# Patient Record
Sex: FEMALE | Race: WHITE | NOT HISPANIC OR LATINO | Employment: OTHER | URBAN - METROPOLITAN AREA
[De-identification: names, ages, dates, MRNs, and addresses within clinical notes are randomized per-mention and may not be internally consistent; named-entity substitution may affect disease eponyms.]

---

## 2017-01-10 ENCOUNTER — HOSPITAL ENCOUNTER (OUTPATIENT)
Dept: NEUROLOGY | Facility: CLINIC | Age: 67
Setting detail: THERAPIES SERIES
Discharge: STILL ADMITTED OR ADMINISTRATIVE | End: 2017-01-10
Attending: SOCIAL WORKER

## 2017-01-10 DIAGNOSIS — F43.23 ADJUSTMENT DISORDER WITH MIXED ANXIETY AND DEPRESSED MOOD: ICD-10-CM

## 2017-01-26 ENCOUNTER — OFFICE VISIT (OUTPATIENT)
Dept: PSYCHOLOGY | Facility: CLINIC | Age: 67
End: 2017-01-26

## 2017-01-26 DIAGNOSIS — Z63.0 MARITAL/PARTNER RELATIONAL PROBLEM: ICD-10-CM

## 2017-01-26 DIAGNOSIS — F32.1 MODERATE SINGLE CURRENT EPISODE OF MAJOR DEPRESSIVE DISORDER (H): Primary | ICD-10-CM

## 2017-01-26 SDOH — SOCIAL STABILITY - SOCIAL INSECURITY: PROBLEMS IN RELATIONSHIP WITH SPOUSE OR PARTNER: Z63.0

## 2017-01-26 NOTE — PROGRESS NOTES
Health Psychology                  Clinic    Department of Medicine  Kendra Ceballos, PhD, LP [phone]                          Clinics and Surgery Center  [facility] Garett Hoyt, PhD, LP [phone]                  3rd Floor  [place] Mail Code [address]   Jah Haas, PhD, ABPP, LP [phone]     [address] [facility],   [address] [facility],  Esperanza Rosas,  PhD, LP [phone]            [place] [address] Blanca Gonzalez, PhD, LP [phone]    Health Psychology Follow-Up Note    SUBJECTIVE:  Mercedes Hairston is a 66-year-old female self-referred to Health Psychology for difficulty coping with marital stress and her partner's illness.  She was seen for a psychotherapy for depression, adjustment to relationship stress, and alcohol use.  Patient and her partner begun marital therapy at [facility] and patient stated that it is going well. She stated she continues to feel sad and afraid related to uncertainty in housing, but has realized sale of the home does not mean that they will break-up. Has begun starting to accept sale of her partner's home rather than continuing to fight it. Patient stated she largely cut out alcohol since 1/2/17 due to abstaining from alcohol in January with friends. Has not  Had difficulty stopping alcohol or any withdrawal symptoms, and she wishes to continue to abstain after Jan ends. She wishes to find support groups for alcohol use in the area.  Encouraged consideration of evidence that supports and refutes automatic thoughts. She reported she had been thinking flexibly by finding the positive in the midst of housing and relations stressors. Encouraged patient to consider aspects of situation that are in vs out of her control. Discussed what was in her control (self-care, responses to stress, behavior, decisions about if and when she moves and stays in relationship). She reported finding great benefit in thinking flexibly  about her housing and relationship. Encouraged present-moment focus by practicing mindful engagement in daily tasks. She also discussed new awareness of possibility accepting situation, which she feels would free her and her partner to build a new chapter together despite not wanting to sell home.     OBJECTIVE:  The patient was on time, appropriately groomed and dressed and demonstrated good eye contact.  She was alert and oriented to person, place, time and situation.  There was no evidence of psychomotor agitation. Cognition and memory were not formally assessed, but no difficulties were apparent upon interview.  Speech was clear, logical and coherent and of normal rate, rhythm and volume.  Thought content was appropriate to situation.  Thoughts were overall logical and organized.  Mood was dysphoric and affect was mood congruent.  The patient was tearful when describing her marital stress.  Insight and motivation appeared good.  The patient denies current suicidal or assaultive ideation, plan, or intent.     ASSESSMENT:  Mercedes Hairston is a 66-year-old female with COPD and breast cancer who presents major depressive disorder, moderate, with anxious distress and a comorbid alcohol use disorder.  She is motivated to participate in therapy at this time to improve her ability to cope with depression and anxiety, reduce alcohol use, and improve her ability to assertively communicate. Appeared to understand rationale for cognitive behavioral strategies. Appearing to derive benefit from cognitive interventions. Will monitor need for separate alcohol use treatment as our work continues.     DIAGNOSIS:  Major depressive disorder, single episode, moderate, with anxious distress (F32.1)  Alcohol use disorder (F10.10)   Marital/partner relational stress (Z63.0)    PLAN:  The patient will return to clinic in approximately 3 weeks for psychotherapy for depression.     Time in: 3:04  Time out: 3:58  Extended session due to  complexity of case and length of interval.    Esperanza Rosas, PhD, LP  Clinical Health Psychologist    Tx plan completed: 11/23/16  Tx plan due:  11/23/17

## 2017-07-19 ENCOUNTER — AMBULATORY - HEALTHEAST (OUTPATIENT)
Dept: NEUROLOGY | Facility: CLINIC | Age: 67
End: 2017-07-19

## 2017-12-15 RX ORDER — AMPICILLIN TRIHYDRATE 250 MG
600 CAPSULE ORAL DAILY
COMMUNITY
End: 2024-09-27

## 2017-12-15 RX ORDER — EXEMESTANE 25 MG/1
25 TABLET ORAL DAILY
COMMUNITY
End: 2024-09-27

## 2017-12-15 RX ORDER — MAG HYDROX/ALUMINUM HYD/SIMETH 400-400-40
450 SUSPENSION, ORAL (FINAL DOSE FORM) ORAL 3 TIMES DAILY
COMMUNITY
End: 2024-09-27

## 2017-12-18 ENCOUNTER — SURGERY (OUTPATIENT)
Age: 67
End: 2017-12-18

## 2017-12-18 ENCOUNTER — ANESTHESIA (OUTPATIENT)
Dept: SURGERY | Facility: CLINIC | Age: 67
End: 2017-12-18
Payer: MEDICARE

## 2017-12-18 ENCOUNTER — HOSPITAL ENCOUNTER (OUTPATIENT)
Facility: CLINIC | Age: 67
Discharge: HOME | End: 2017-12-18
Attending: OPHTHALMOLOGY | Admitting: OPHTHALMOLOGY
Payer: MEDICARE

## 2017-12-18 ENCOUNTER — ANESTHESIA EVENT (OUTPATIENT)
Dept: SURGERY | Facility: CLINIC | Age: 67
End: 2017-12-18
Payer: MEDICARE

## 2017-12-18 VITALS
BODY MASS INDEX: 26.96 KG/M2 | WEIGHT: 167.77 LBS | OXYGEN SATURATION: 97 % | RESPIRATION RATE: 16 BRPM | SYSTOLIC BLOOD PRESSURE: 127 MMHG | HEIGHT: 66 IN | TEMPERATURE: 96.6 F | DIASTOLIC BLOOD PRESSURE: 61 MMHG

## 2017-12-18 DIAGNOSIS — H21.82: Primary | ICD-10-CM

## 2017-12-18 DIAGNOSIS — H25.12 AGE-RELATED NUCLEAR CATARACT OF LEFT EYE: ICD-10-CM

## 2017-12-18 PROCEDURE — 25000132 ZZH RX MED GY IP 250 OP 250 PS 637: Mod: GY | Performed by: OPHTHALMOLOGY

## 2017-12-18 PROCEDURE — 25000128 H RX IP 250 OP 636: Performed by: OPHTHALMOLOGY

## 2017-12-18 PROCEDURE — 36000104 ZZH EYE SURGERY LEVEL 4 1ST 30 MIN: Performed by: OPHTHALMOLOGY

## 2017-12-18 PROCEDURE — 71000028 ZZH EYE RECOVERY PHASE 2 EACH 15 MINS: Performed by: OPHTHALMOLOGY

## 2017-12-18 PROCEDURE — 25000128 H RX IP 250 OP 636: Performed by: NURSE ANESTHETIST, CERTIFIED REGISTERED

## 2017-12-18 PROCEDURE — 66711 ECP CILIARY BODY DESTRUCTION: CPT | Performed by: OPHTHALMOLOGY

## 2017-12-18 PROCEDURE — 27210995 ZZH RX 272: Performed by: OPHTHALMOLOGY

## 2017-12-18 PROCEDURE — 25000125 ZZHC RX 250: Performed by: OPHTHALMOLOGY

## 2017-12-18 PROCEDURE — V2632 POST CHMBR INTRAOCULAR LENS: HCPCS | Performed by: OPHTHALMOLOGY

## 2017-12-18 PROCEDURE — A9270 NON-COVERED ITEM OR SERVICE: HCPCS | Performed by: OPHTHALMOLOGY

## 2017-12-18 PROCEDURE — 25000125 ZZHC RX 250: Performed by: NURSE ANESTHETIST, CERTIFIED REGISTERED

## 2017-12-18 PROCEDURE — 37000008 ZZH ANESTHESIA TECHNICAL FEE, 1ST 30 MIN: Performed by: OPHTHALMOLOGY

## 2017-12-18 PROCEDURE — 40000170 ZZH STATISTIC PRE-PROCEDURE ASSESSMENT II: Performed by: OPHTHALMOLOGY

## 2017-12-18 PROCEDURE — 27210794 ZZH OR GENERAL SUPPLY STERILE: Performed by: OPHTHALMOLOGY

## 2017-12-18 PROCEDURE — A9270 NON-COVERED ITEM OR SERVICE: HCPCS | Mod: GY | Performed by: OPHTHALMOLOGY

## 2017-12-18 PROCEDURE — 36000105 ZZH EYE SURGERY LEVEL 4 EA 15 ADDTL MIN: Performed by: OPHTHALMOLOGY

## 2017-12-18 PROCEDURE — 37000009 ZZH ANESTHESIA TECHNICAL FEE, EACH ADDTL 15 MIN: Performed by: OPHTHALMOLOGY

## 2017-12-18 PROCEDURE — 25000125 ZZHC RX 250: Performed by: ANESTHESIOLOGY

## 2017-12-18 DEVICE — EYE IMP IOL ALCON PCL SN60WF ACRYSOF IQ 19.0: Type: IMPLANTABLE DEVICE | Site: EYE | Status: FUNCTIONAL

## 2017-12-18 RX ORDER — TROPICAMIDE 10 MG/ML
1 SOLUTION/ DROPS OPHTHALMIC
Status: COMPLETED | OUTPATIENT
Start: 2017-12-18 | End: 2017-12-18

## 2017-12-18 RX ORDER — SODIUM CHLORIDE, SODIUM LACTATE, POTASSIUM CHLORIDE, CALCIUM CHLORIDE 600; 310; 30; 20 MG/100ML; MG/100ML; MG/100ML; MG/100ML
INJECTION, SOLUTION INTRAVENOUS CONTINUOUS
Status: DISCONTINUED | OUTPATIENT
Start: 2017-12-18 | End: 2017-12-18 | Stop reason: HOSPADM

## 2017-12-18 RX ORDER — HYDROMORPHONE HYDROCHLORIDE 1 MG/ML
.3-.5 INJECTION, SOLUTION INTRAMUSCULAR; INTRAVENOUS; SUBCUTANEOUS EVERY 10 MIN PRN
Status: DISCONTINUED | OUTPATIENT
Start: 2017-12-18 | End: 2017-12-18 | Stop reason: HOSPADM

## 2017-12-18 RX ORDER — FENTANYL CITRATE 50 UG/ML
25-50 INJECTION, SOLUTION INTRAMUSCULAR; INTRAVENOUS
Status: DISCONTINUED | OUTPATIENT
Start: 2017-12-18 | End: 2017-12-18 | Stop reason: HOSPADM

## 2017-12-18 RX ORDER — PHENYLEPHRINE HYDROCHLORIDE 25 MG/ML
1 SOLUTION/ DROPS OPHTHALMIC
Status: COMPLETED | OUTPATIENT
Start: 2017-12-18 | End: 2017-12-18

## 2017-12-18 RX ORDER — DEXAMETHASONE SODIUM PHOSPHATE 4 MG/ML
INJECTION, SOLUTION INTRA-ARTICULAR; INTRALESIONAL; INTRAMUSCULAR; INTRAVENOUS; SOFT TISSUE PRN
Status: DISCONTINUED | OUTPATIENT
Start: 2017-12-18 | End: 2017-12-18

## 2017-12-18 RX ORDER — DEXAMETHASONE SODIUM PHOSPHATE 4 MG/ML
INJECTION, SOLUTION INTRA-ARTICULAR; INTRALESIONAL; INTRAMUSCULAR; INTRAVENOUS; SOFT TISSUE PRN
Status: DISCONTINUED | OUTPATIENT
Start: 2017-12-18 | End: 2017-12-18 | Stop reason: HOSPADM

## 2017-12-18 RX ORDER — OFLOXACIN 3 MG/ML
1 SOLUTION/ DROPS OPHTHALMIC 4 TIMES DAILY
Qty: 2 ML | Refills: 0 | Status: SHIPPED | OUTPATIENT
Start: 2017-12-18 | End: 2017-12-25

## 2017-12-18 RX ORDER — LIDOCAINE HYDROCHLORIDE 10 MG/ML
INJECTION, SOLUTION EPIDURAL; INFILTRATION; INTRACAUDAL; PERINEURAL PRN
Status: DISCONTINUED | OUTPATIENT
Start: 2017-12-18 | End: 2017-12-18 | Stop reason: HOSPADM

## 2017-12-18 RX ORDER — PROPARACAINE HYDROCHLORIDE 5 MG/ML
1 SOLUTION/ DROPS OPHTHALMIC ONCE
Status: COMPLETED | OUTPATIENT
Start: 2017-12-18 | End: 2017-12-18

## 2017-12-18 RX ORDER — ONDANSETRON 2 MG/ML
INJECTION INTRAMUSCULAR; INTRAVENOUS PRN
Status: DISCONTINUED | OUTPATIENT
Start: 2017-12-18 | End: 2017-12-18

## 2017-12-18 RX ORDER — ONDANSETRON 2 MG/ML
4 INJECTION INTRAMUSCULAR; INTRAVENOUS EVERY 30 MIN PRN
Status: DISCONTINUED | OUTPATIENT
Start: 2017-12-18 | End: 2017-12-18 | Stop reason: HOSPADM

## 2017-12-18 RX ORDER — ACETAMINOPHEN 500 MG
1000 TABLET ORAL
Status: COMPLETED | OUTPATIENT
Start: 2017-12-18 | End: 2017-12-18

## 2017-12-18 RX ORDER — PREDNISOLONE ACETATE 10 MG/ML
1 SUSPENSION/ DROPS OPHTHALMIC 4 TIMES DAILY
Qty: 6 ML | Refills: 0 | Status: SHIPPED | OUTPATIENT
Start: 2017-12-18 | End: 2018-01-17

## 2017-12-18 RX ORDER — MEPERIDINE HYDROCHLORIDE 25 MG/ML
12.5 INJECTION INTRAMUSCULAR; INTRAVENOUS; SUBCUTANEOUS
Status: DISCONTINUED | OUTPATIENT
Start: 2017-12-18 | End: 2017-12-18 | Stop reason: HOSPADM

## 2017-12-18 RX ORDER — TETRACAINE HYDROCHLORIDE 5 MG/ML
SOLUTION OPHTHALMIC PRN
Status: DISCONTINUED | OUTPATIENT
Start: 2017-12-18 | End: 2017-12-18 | Stop reason: HOSPADM

## 2017-12-18 RX ORDER — NALOXONE HYDROCHLORIDE 0.4 MG/ML
.1-.4 INJECTION, SOLUTION INTRAMUSCULAR; INTRAVENOUS; SUBCUTANEOUS
Status: DISCONTINUED | OUTPATIENT
Start: 2017-12-18 | End: 2017-12-18 | Stop reason: HOSPADM

## 2017-12-18 RX ORDER — BALANCED SALT SOLUTION 6.4; .75; .48; .3; 3.9; 1.7 MG/ML; MG/ML; MG/ML; MG/ML; MG/ML; MG/ML
SOLUTION OPHTHALMIC PRN
Status: DISCONTINUED | OUTPATIENT
Start: 2017-12-18 | End: 2017-12-18 | Stop reason: HOSPADM

## 2017-12-18 RX ORDER — ONDANSETRON 4 MG/1
4 TABLET, ORALLY DISINTEGRATING ORAL EVERY 30 MIN PRN
Status: DISCONTINUED | OUTPATIENT
Start: 2017-12-18 | End: 2017-12-18 | Stop reason: HOSPADM

## 2017-12-18 RX ADMIN — Medication 0.5 ML: at 12:07

## 2017-12-18 RX ADMIN — EPINEPHRINE 500 ML: 1 INJECTION, SOLUTION, CONCENTRATE INTRAVENOUS at 12:04

## 2017-12-18 RX ADMIN — MIDAZOLAM 2 MG: 1 INJECTION INTRAMUSCULAR; INTRAVENOUS at 11:36

## 2017-12-18 RX ADMIN — PHENYLEPHRINE HYDROCHLORIDE 1 DROP: 2.5 SOLUTION/ DROPS OPHTHALMIC at 10:54

## 2017-12-18 RX ADMIN — LIDOCAINE HYDROCHLORIDE 1 ML: 10 INJECTION, SOLUTION EPIDURAL; INFILTRATION; INTRACAUDAL; PERINEURAL at 10:55

## 2017-12-18 RX ADMIN — BALANCED SALT SOLUTION 15 ML: 6.4; .75; .48; .3; 3.9; 1.7 SOLUTION OPHTHALMIC at 12:04

## 2017-12-18 RX ADMIN — ACETAMINOPHEN 1000 MG: 500 TABLET, FILM COATED ORAL at 12:47

## 2017-12-18 RX ADMIN — PROPARACAINE HYDROCHLORIDE 1 DROP: 5 SOLUTION/ DROPS OPHTHALMIC at 10:35

## 2017-12-18 RX ADMIN — Medication 0.8 ML: at 12:06

## 2017-12-18 RX ADMIN — LIDOCAINE HYDROCHLORIDE 1 ML: 10 INJECTION, SOLUTION EPIDURAL; INFILTRATION; INTRACAUDAL; PERINEURAL at 12:05

## 2017-12-18 RX ADMIN — PHENYLEPHRINE HYDROCHLORIDE 1 DROP: 2.5 SOLUTION/ DROPS OPHTHALMIC at 10:44

## 2017-12-18 RX ADMIN — TROPICAMIDE 1 DROP: 10 SOLUTION/ DROPS OPHTHALMIC at 10:54

## 2017-12-18 RX ADMIN — SODIUM CHLORIDE, POTASSIUM CHLORIDE, SODIUM LACTATE AND CALCIUM CHLORIDE: 600; 310; 30; 20 INJECTION, SOLUTION INTRAVENOUS at 10:54

## 2017-12-18 RX ADMIN — PHENYLEPHRINE HYDROCHLORIDE 1 DROP: 2.5 SOLUTION/ DROPS OPHTHALMIC at 10:35

## 2017-12-18 RX ADMIN — ONDANSETRON 4 MG: 2 INJECTION INTRAMUSCULAR; INTRAVENOUS at 11:47

## 2017-12-18 RX ADMIN — TETRACAINE HYDROCHLORIDE 2 DROP: 5 SOLUTION OPHTHALMIC at 12:06

## 2017-12-18 RX ADMIN — NEOMYCIN SULFATE, POLYMYXIN B SULFATE, AND DEXAMETHASONE 1 TUBE: 3.5; 10000; 1 OINTMENT OPHTHALMIC at 12:05

## 2017-12-18 RX ADMIN — TROPICAMIDE 1 DROP: 10 SOLUTION/ DROPS OPHTHALMIC at 10:35

## 2017-12-18 RX ADMIN — TROPICAMIDE 1 DROP: 10 SOLUTION/ DROPS OPHTHALMIC at 10:44

## 2017-12-18 RX ADMIN — DEXAMETHASONE SODIUM PHOSPHATE 2 MG: 4 INJECTION, SOLUTION INTRA-ARTICULAR; INTRALESIONAL; INTRAMUSCULAR; INTRAVENOUS; SOFT TISSUE at 12:05

## 2017-12-18 RX ADMIN — DEXMEDETOMIDINE HYDROCHLORIDE 12 MCG: 100 INJECTION, SOLUTION INTRAVENOUS at 11:36

## 2017-12-18 RX ADMIN — WATER 0.5 ML: 1 INJECTION INTRAMUSCULAR; INTRAVENOUS; SUBCUTANEOUS at 12:05

## 2017-12-18 ASSESSMENT — COPD QUESTIONNAIRES: COPD: 1

## 2017-12-18 NOTE — ANESTHESIA POSTPROCEDURE EVALUATION
Patient: Mercedes Hairston    Procedure(s):  COMBINED PHACOEMULSIFICATION CLEAR CORNEA WITH STANDARD INTRAOCULAR LENS IMPLANT, ENDOSCOPIC CYCLOPHOTOCOAGULATION  - Wound Class: I-Clean    Diagnosis:CATARACT, GLAUCOMA  Diagnosis Additional Information: No value filed.    Anesthesia Type:  MAC    Note:  Anesthesia Post Evaluation    Patient location during evaluation: bedside  Patient participation: Able to fully participate in evaluation  Level of consciousness: awake  Pain management: adequate  Airway patency: patent  Cardiovascular status: acceptable  Respiratory status: acceptable  Hydration status: acceptable  PONV: none     Anesthetic complications: None          Last vitals:  Vitals:    12/18/17 1037 12/18/17 1210 12/18/17 1235   BP: 155/80 134/66 127/61   Resp: 16 16 16   Temp: 35.9  C (96.6  F)     SpO2: 98% 96% 97%         Electronically Signed By: Florencio Odell DO, DO  December 18, 2017  3:47 PM

## 2017-12-18 NOTE — OR NURSING
Dr. Palacios spoke with pt in Phase II. She instructed pt to take eye patch off in AM before coming to clinic and to use the eye drops as indicated in directions. Then to bring all drops to her clinic appointment tomorrow.   (Included on written discharge instructions.)

## 2017-12-18 NOTE — ANESTHESIA CARE TRANSFER NOTE
Patient: Mercedes Hairston    Procedure(s):  COMBINED PHACOEMULSIFICATION CLEAR CORNEA WITH STANDARD INTRAOCULAR LENS IMPLANT, ENDOSCOPIC CYCLOPHOTOCOAGULATION  - Wound Class: I-Clean    Diagnosis: CATARACT, GLAUCOMA  Diagnosis Additional Information: No value filed.    Anesthesia Type:   MAC     Note:  Airway :Room Air  Patient transferred to:PACU  Comments: Awake, alert, VSS, report to RN, monitors and alarms on, IV patent. Handoff Report: Identifed the Patient, Identified the Reponsible Provider, Reviewed the pertinent medical history, Discussed the surgical course, Reviewed Intra-OP anesthesia mangement and issues during anesthesia, Set expectations for post-procedure period and Allowed opportunity for questions and acknowledgement of understanding      Vitals: (Last set prior to Anesthesia Care Transfer)    CRNA VITALS  12/18/2017 1135 - 12/18/2017 1211      12/18/2017             NIBP: 128/73    NIBP Mean: 96                Electronically Signed By: LOUIS Luis CRNA  December 18, 2017  12:11 PM

## 2017-12-18 NOTE — IP AVS SNAPSHOT
MRN:[id number]                      After Visit Summary   12/18/2017    Mercedes Hairston    MRN: [id number]           Thank you!     Thank you for choosing [facility] for your care. Our goal is always to provide you with excellent care. Hearing back from our patients is one way we can continue to improve our services. Please take a few minutes to complete the written survey that you may receive in the mail after you visit with us. Thank you!        Patient Information     Date Of Birth          1950        About your hospital stay     You were admitted on:  December 18, 2017 You last received care in the:  [facility] [place]    You were discharged on:  December 18, 2017       Who to Call     For medical emergencies, please call 911.  For non-urgent questions about your medical care, please call your primary care provider or clinic, [phone]  For questions related to your surgery, please call your surgery clinic        Attending Provider     Provider Connie Severino MD Ophthalmology       Primary Care Provider Office Phone # Fax #    Anuel Kelly [phone]      After Care Instructions     Eye drops as prescribed by physician.  Start drops today unless told otherwise by the physician           May use Tylenol or Advil for pain as directed by the physician           Notify Physician if you have severe headache or nausea           Remove patch per physician instruction           Return to clinic as instructed by physician           Wear eye shield or patch as directed                 Further instructions from your care team       [facility] Discharge  Instructions  Anesthesia ([facility] [place])   Adult Discharge Instructions    For 24 hours after surgery    1. Get plenty of rest.  Make arrangements to have a responsible adult stay with you for at least 6 hours after you leave the hospital.  2. Do  not drive or use heavy equipment for 24 hours.    3. Do not drink alcohol for 24 hours.  4. Do not sign legal documents or make important decisions for 24 hours.  5. Avoid strenuous or risky activities. You may feel lightheaded.  If so, sit for a few minutes before standing.  Have someone help you get up.   6. Conscious sedation patients may resume a regular diet..  7. Any questions of medical nature, call your physician.        Discharge Instructions  Post-Operative Cataract Surgery  Dr. Palacios  [phone]      Pain Management:      Some mild discomfort is normal following surgery.  If you are currently taking any medications for pain, you may continue to take what you normally do.  Otherwise, you may take an over-the-counter medication such as Tylenol (acetaminophen) or ibuprofen (Advil) for relief.  Take as instructed on the bottle.    If you are experiencing uncontrollable pain or have other concerns, call [phone].    Other Medications:    No eye drops to the operative eye tonight.  Please remember to bring all your drops/supplies with you to your post-op appointment.  You will be instructed on the drops at that time.    If you currently take pills to treat glaucoma, continue as normal.    You may resume your regular home medications, including any drops you are using in your NON-operative eye.      General Rules:    Avoid strenuous activity. Do not do anything that would cause you to strain or make your face red. Open your mouth if you cough, use a laxative if necessary, do not lift greater than 5 pounds.    Keep your head above your heart.  Do not bend down lower than waist level.  Bend with your knees.    Keep the bandage over your eye.  The doctor will remove the bandage at your appointment at the clinic.    Try to write down any questions you may have to bring to your post-op appointment.    Be restful today.    Take eye patch off in the morning (12/19/17)  Use eye drops as instructed on the  "bottle.   Keep the clear eye shield.   Bring Eye drops to your clinic appointment with Dr. Philip jamison.       Pending Results     No orders found from 2017 to 2017.            Admission Information     Date & Time Provider Department Dept. Phone    2017 Connie Palacios MD [facility] [place] [phone]      Your Vitals Were     Blood Pressure Temperature Respirations Height Weight Pulse Oximetry    134/66 96.6  F (35.9  C) (Temporal) 16 1.687 m (5' 6.42\") 76.1 kg (167 lb 12.3 oz) 96%    BMI (Body Mass Index)                   26.74 kg/m2           MyChart Information     [email or web address] lets you send messages to your doctor, view your test results, renew your prescriptions, schedule appointments and more. To sign up, go to [email or web address] . Click on \"Log in\" on the left side of the screen, which will take you to the Welcome page. Then click on \"Sign up Now\" on the right side of the page.     You will be asked to enter the access code listed below, as well as some personal information. Please follow the directions to create your username and password.     Your access code is: [email or web address]  Expires: 3/18/2018 12:35 PM     Your access code will  in 90 days. If you need help or a new code, please call your [place] clinic or [phone].        Care EveryWhere ID     This is your Care EveryWhere ID. This could be used by other organizations to access your [place] medical records  [id number]        Equal Access to Services     [facility]: Hadii katy whalen Soalex, waaxda luqadaha, qaybta kaalmakaterin trevino. So [facility] [phone].    ATENCIÓN: Si habla español, tiene a willett disposición servicios gratuitos de asistencia lingüística. Llame al [phone].    We comply with applicable federal civil rights laws and [place] laws. We do not discriminate on the basis of race, color, national origin, age, disability, sex, " sexual orientation, or gender identity.               Review of your medicines      UNREVIEWED medicines. Ask your doctor about these medicines        Dose / Directions    exemestane 25 MG tablet   Commonly known as:  AROMASIN        Dose:  25 mg   Take 25 mg by mouth daily   Refills:  0       fluticasone-salmeterol 250-50 MCG/DOSE diskus inhaler   Commonly known as:  ADVAIR DISKUS   Used for:  COPD (chronic obstructive pulmonary disease) (H)        INHALE 1 PUFF INTO THE LUNGS 2 TIMES DAILY   Quantity:  1 Inhaler   Refills:  12       ipratropium 17 MCG/ACT Inhaler   Commonly known as:  ATROVENT HFA   Used for:  COPD (chronic obstructive pulmonary disease) (H)        Dose:  2 puff   Inhale 2 puffs into the lungs every 6 hours   Quantity:  1 Inhaler   Refills:  12       levalbuterol 1.25 MG/0.5ML Nebu neb solution   Commonly known as:  XOPENEX CONC   Used for:  COPD exacerbation (H)        Dose:  1.25 mg   Take 0.5 mLs (1.25 mg) by nebulization every 6 hours as needed for wheezing   Quantity:  60 mL   Refills:  1       metoprolol 25 MG tablet   Commonly known as:  LOPRESSOR        Dose:  25 mg   Take 25 mg by mouth 2 times daily.   Refills:  0       oseltamivir 75 MG capsule   Commonly known as:  TAMIFLU   Used for:  COPD exacerbation (H)        Dose:  75 mg   Take 1 capsule (75 mg) by mouth 2 times daily   Quantity:  10 capsule   Refills:  0       red yeast rice 600 MG Caps        Dose:  600 mg   Take 600 mg by mouth daily   Refills:  0       saw palmetto 450 MG Caps capsule        Dose:  450 mg   Take 450 mg by mouth 3 times daily   Refills:  0       tiotropium 2.5 MCG/ACT inhalation aerosol   Commonly known as:  SPIRIVA RESPIMAT   Used for:  COPD (chronic obstructive pulmonary disease) (H)        Dose:  2 puff   Inhale 2 puffs into the lungs daily   Quantity:  12 g   Refills:  11       TRIAMTERENE PO        Refills:  0       XOPENEX HFA 45 MCG/ACT Inhaler   Used for:  Chronic obstructive pulmonary disease with  acute exacerbation (H)   Generic drug:  levalbuterol        INHALE 1-2 PUFFS INTO THE LUNGS EVERY 6 HOURS AS NEEDED FOR SHORTNESS OF BREATH / DYSPNEA   Quantity:  3 Inhaler   Refills:  3         START taking        Dose / Directions    ofloxacin 0.3 % ophthalmic solution   Commonly known as:  OCUFLOX   Used for:  Plateau iris syndrome, 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   Quantity:  1 Device   Refills:  0       * order for DME   Used for:  Chronic obstructive pulmonary disease, unspecified COPD type (H)        Equipment being ordered: Portable travelling Nebulizer   Quantity:  1 Device   Refills:  0       * Notice:  This list has 2 medication(s) that are the same as other medications prescribed for you. Read the directions carefully, and ask your doctor or other care provider to review them with you.         Where to get your medicines      These medications were sent to [facility] [place] - [address]  [address], [place] [address]     Phone:  [phone]     ofloxacin 0.3 % ophthalmic solution    prednisoLONE acetate 1 % ophthalmic susp                Protect others around you: Learn how to safely use, store and throw away your medicines at www.disposemymeds.org.             Medication List: This is a list of all your medications and when to take them. Check marks below indicate your  daily home schedule. Keep this list as a reference.      Medications           Morning Afternoon Evening Bedtime As Needed    exemestane 25 MG tablet   Commonly known as:  AROMASIN   Take 25 mg by mouth daily                                fluticasone-salmeterol 250-50 MCG/DOSE diskus inhaler   Commonly known as:  ADVAIR DISKUS   INHALE 1 PUFF INTO THE LUNGS 2 TIMES DAILY                                ipratropium 17 MCG/ACT Inhaler   Commonly known as:  ATROVENT HFA   Inhale 2 puffs into the lungs every 6 hours                                levalbuterol 1.25 MG/0.5ML Nebu neb solution   Commonly known as:  XOPENEX CONC   Take 0.5 mLs (1.25 mg) by nebulization every 6 hours as needed for wheezing                                metoprolol 25 MG tablet   Commonly known as:  LOPRESSOR   Take 25 mg by mouth 2 times daily.                                ofloxacin 0.3 % ophthalmic solution   Commonly known as:  OCUFLOX   Place 1 drop Into the left eye 4 times daily for 7 days                                * order for DME   Equipment being ordered: Nebulizer - Portable for travel.                                * order for DME   Equipment being ordered: Portable travelling Nebulizer                                oseltamivir 75 MG capsule   Commonly known as:  TAMIFLU   Take 1 capsule (75 mg) by mouth 2 times daily                                prednisoLONE acetate 1 % ophthalmic susp   Commonly known as:  PRED FORTE   Place 1 drop Into the left eye 4 times daily                                red yeast rice 600 MG Caps   Take 600 mg by mouth daily                                saw palmetto 450 MG Caps capsule   Take 450 mg by mouth 3 times daily                                tiotropium 2.5 MCG/ACT inhalation aerosol   Commonly known as:  SPIRIVA RESPIMAT   Inhale 2 puffs into the lungs daily                                TRIAMTERENE PO                                XOPENEX HFA 45 MCG/ACT Inhaler   INHALE 1-2 PUFFS  INTO THE LUNGS EVERY 6 HOURS AS NEEDED FOR SHORTNESS OF BREATH / DYSPNEA   Generic drug:  levalbuterol                                * Notice:  This list has 2 medication(s) that are the same as other medications prescribed for you. Read the directions carefully, and ask your doctor or other care provider to review them with you.

## 2017-12-18 NOTE — OP NOTE
Patient:    Mercedes Hairston                                                  Reg No:     [id number]                                                   Date:  2017                                                  :   1950                                                      Attending:  TERRELL RODRIGUEZ M.D.                                                    Surgeon:    TERRELL RODRIGUEZ M.D.                                      Service Dt: Ophthalmology                                               OPERATIVE REPORT          ANESTHESIA:   Monitored anesthetic care with Sub-Tenon block of mixture of 2 cc of 2% lidocaine and 2 cc of 0.75% marcaine and hyaluronidase and topical tetracaine and intracameral preservative free lidocaine 1%      PREOPERATIVE DIAGNOSIS (ES):   1. Cataract, left eye  2. Glaucoma (plateau iris), left eye     POSTOPERATIVE DIAGNOSIS (ES):   The same     NAME OF OPERATION:   1. Phacoemulsification and intraocular lens implant, left eye  IOL model: [id number]  IOl power: 19.0 D  IOL serial number: [id number]    2. Endoscopic cyclophotocoagulation,  left eye, settings: Energy 0.25, mode continuous, area treated 270 degree    COMPLICATIONS:  None    Blood loss:  None    SPECIMENS REMOVED:  None    INDICATIONS FOR PROCEDURE:  The patient is a 67 year old female. The patient was evaluated in the office and diagnosed with visually significant cataract and glaucoma (plateau iris) in the  left eye.  The benefits, alternatives, and risks of the procedure were discussed with the patient, including the risk of infection, inflammation, bleeding, blindness, need for another procedure, and elevated intraocular pressure, need for additional glaucoma eye drops or surgery. The patient understood the benefits, alternatives and risks and has elected to proceed with the surgery.     PROCEDURE: After appropriate pre-operative consent was obtained the patient was brought to the OR and anesthesia team  hooked up monitoring. The time out was taken to identify the patient, surgery, the implant and eye. The biometry and IOL calculations of the operative eye done in 2016 were reviewed and appropriate IOL was selected. After that the anesthesia team induced MAC. The eye was prepped and draped in sterile ophthalmic fashion. Tetracaine drops were placed in the eye. The lid speculum was placed in the eye. Sub-Tenon block was given in standard fashion. Paracentesis was performed with paracentesis knife. The AC was filled with preservative free lidocaine 1% and Viscoat. The main wound was constructed temporally with 2.4 mm keratome. The capsulorhexis was started with a cystotome and completed with Utratta forcepts. Hydrodissection was performed with a blunt tip cannula and balanced salt solution. Phacoemulsification was used to remove the nucleous. Irrigation/aspiration were used to remove the cortex. The capsular bag was re-filled with Provisc. The intraocular lens was injected into the capsular bag. Irrigation/aspiration were used to remove viscoelastic.   After that the attention was brought to endoscopic cyclophotocoagulation. Sulcus was filled with viscoelastic. Endoscopic cyclophotocoagulation was performed using above settings. Irrigation/aspiration were used to remove viscoelastic.   Both wounds were hydrated and checked for leak using weck-cells. Tissue glue was placed on the corneal wounds. AC was re-formed with balanced salt solution.   At the end of the surgery the AC was deep, and no leak was identified. Dexamethazone and Ceftazidime were injected into inferior subconjunctival space. The lid speculum was removed. Maxitrol ointment was placed in the eye. The eye was patched and shielded in standard ophthalmic fashion.     DISPOSITION: The patient was taken to the recovery room in stable condition having tolerated the procedure well. The patient will be given post-operative instructions and seen in the eye clinic  tomorrow.      SPONGE/INSTRUMENT/NEEDLE COUNTS:   All sponge and needle counts were correct at the end of the case.     ____________________________   Connie RODRIGUEZ M.D.

## 2017-12-18 NOTE — DISCHARGE INSTRUCTIONS
[facility] Discharge  Instructions  Anesthesia ([facility])   Adult Discharge Instructions    For 24 hours after surgery    1. Get plenty of rest.  Make arrangements to have a responsible adult stay with you for at least 6 hours after you leave the hospital.  2. Do not drive or use heavy equipment for 24 hours.    3. Do not drink alcohol for 24 hours.  4. Do not sign legal documents or make important decisions for 24 hours.  5. Avoid strenuous or risky activities. You may feel lightheaded.  If so, sit for a few minutes before standing.  Have someone help you get up.   6. Conscious sedation patients may resume a regular diet..  7. Any questions of medical nature, call your physician.        Discharge Instructions  Post-Operative Cataract Surgery  Dr. Palacios  [phone]      Pain Management:      Some mild discomfort is normal following surgery.  If you are currently taking any medications for pain, you may continue to take what you normally do.  Otherwise, you may take an over-the-counter medication such as Tylenol (acetaminophen) or ibuprofen (Advil) for relief.  Take as instructed on the bottle.    If you are experiencing uncontrollable pain or have other concerns, call [phone].    Other Medications:    No eye drops to the operative eye tonight.  Please remember to bring all your drops/supplies with you to your post-op appointment.  You will be instructed on the drops at that time.    If you currently take pills to treat glaucoma, continue as normal.    You may resume your regular home medications, including any drops you are using in your NON-operative eye.      General Rules:    Avoid strenuous activity. Do not do anything that would cause you to strain or make your face red. Open your mouth if you cough, use a laxative if necessary, do not lift greater than 5 pounds.    Keep your head above your heart.  Do not bend down lower than waist level.  Bend with your  knees.    Keep the bandage over your eye.  The doctor will remove the bandage at your appointment at the clinic.    Try to write down any questions you may have to bring to your post-op appointment.    Be restful today.    Take eye patch off in the morning (12/19/17)  Use eye drops as instructed on the bottle.   Keep the clear eye shield.   Bring Eye drops to your clinic appointment with Dr. Philip jamison.     Acetaminophen (Tylenol) 1000mg taken at 12:45pm

## 2017-12-18 NOTE — ANESTHESIA PREPROCEDURE EVALUATION
Anesthesia Evaluation     .             ROS/MED HX    ENT/Pulmonary:     (+)COPD, , . .   (-) sleep apnea   Neurologic:  - neg neurologic ROS     Cardiovascular:  - neg cardiovascular ROS       METS/Exercise Tolerance:     Hematologic:  - neg hematologic  ROS       Musculoskeletal:  - neg musculoskeletal ROS       GI/Hepatic:        (-) GERD   Renal/Genitourinary:  - ROS Renal section negative       Endo:  - neg endo ROS       Psychiatric:  - neg psychiatric ROS       Infectious Disease:         Malignancy:   (+) Malignancy History of Breast          Other:                     Physical Exam  Normal systems: cardiovascular, pulmonary and dental    Airway   Mallampati: II  TM distance: >3 FB  Neck ROM: full    Dental     Cardiovascular       Pulmonary                     Anesthesia Plan      History & Physical Review  History and physical reviewed and following examination; no interval change.    ASA Status:  2 .    NPO Status:  > 8 hours    Plan for MAC Reason for MAC:  Procedure to face, neck, head or breast  PONV prophylaxis:  Ondansetron (or other 5HT-3)       Postoperative Care  Postoperative pain management:  IV analgesics.      Consents  Anesthetic plan, risks, benefits and alternatives discussed with:  Patient..        Procedure: Procedure(s):  COMBINED PHACOEMULSIFICATION CLEAR CORNEA WITH STANDARD INTRAOCULAR LENS IMPLANT, ENDOSCOPIC CYCLOPHOTOCOAGULATION  Preop diagnosis: CATARACT, GLAUCOMA    Allergies   Allergen Reactions     Sulfa Drugs      Serevent [Salmeterol] Hives     Past Medical History:   Diagnosis Date     COPD (chronic obstructive pulmonary disease) (H)      No past surgical history on file.  Social History   Substance Use Topics     Smoking status: Former Smoker     Packs/day: 2.00     Years: 16.00     Types: Cigarettes     Quit date: 7/22/1988     Smokeless tobacco: Never Used     Alcohol use Not on file     Prior to Admission medications    Medication Sig Start Date End Date Taking?  Authorizing Provider   saw palmetto 450 MG CAPS capsule Take 450 mg by mouth 3 times daily   Yes Reported, Patient   red yeast rice 600 MG CAPS Take 600 mg by mouth daily   Yes Reported, Patient   exemestane (AROMASIN) 25 MG tablet Take 25 mg by mouth daily   Yes Reported, Patient   fluticasone-salmeterol (ADVAIR DISKUS) 250-50 MCG/DOSE diskus inhaler INHALE 1 PUFF INTO THE LUNGS 2 TIMES DAILY 5/17/16   Perlman, David Morris, MD   XOPENEX HFA 45 MCG/ACT inhaler INHALE 1-2 PUFFS INTO THE LUNGS EVERY 6 HOURS AS NEEDED FOR SHORTNESS OF BREATH / DYSPNEA 3/28/16   Perlman, David Morris, MD   oseltamivir (TAMIFLU) 75 MG capsule Take 1 capsule (75 mg) by mouth 2 times daily 2/3/16   Perlman, David Morris, MD   order for DME Equipment being ordered: Portable travelling Nebulizer 1/26/16   Perlman, David Morris, MD   TRIAMTERENE PO     Reported, Patient   tiotropium (SPIRIVA RESPIMAT) 2.5 MCG/ACT inhalation aerosol Inhale 2 puffs into the lungs daily 8/11/15   Perlman, David Morris, MD   ORDER FOR DME Equipment being ordered: Nebulizer - Portable for travel. 4/9/15   Perlman, David Morris, MD   levalbuterol (XOPENEX CONC) 1.25 MG/0.5ML NEBU Take 0.5 mLs (1.25 mg) by nebulization every 6 hours as needed for wheezing 1/15/15   Perlman, David Morris, MD   ipratropium (ATROVENT HFA) 17 MCG/ACT inhaler Inhale 2 puffs into the lungs every 6 hours 1/13/15   Perlman, David Morris, MD   formoterol (FORADIL) 12 MCG capsule for inhaler Inhale 1 capsule (12 mcg) into the lungs 2 times daily 9/23/14   Perlman, David Morris, MD   metoprolol (LOPRESSOR) 25 MG tablet Take 25 mg by mouth 2 times daily.    Reported, Patient   HYDRALAZINE-HCTZ PO Take 37.5 mg daily     Reported, Patient     Current Facility-Administered Medications Ordered in Epic   Medication Dose Route Frequency Last Rate Last Dose     lactated ringers infusion   Intravenous Continuous         proparacaine (ALCAINE) 0.5 % ophthalmic solution 1 drop  1 drop Ophthalmic Once          phenylephrine (MYDFRIN /JUNG-SYNEPHRINE) 2.5 % ophthalmic solution 1 drop  1 drop Ophthalmic q5 Min Prior to Surgery         tropicamide (MYDRIACYL) 1 % ophthalmic solution 1 drop  1 drop Ophthalmic q5 Min Prior to Surgery         bupivacaine 0.75% (pf) 4.5mL + lidocaine 2% (pf) 4.5mL + hyaluronidase (HYLENEX) 150 units   Retrobulbar Once         No current [facility]-ordered outpatient prescriptions on file.       lactated ringers       Wt Readings from Last 1 Encounters:   12/15/17 76.1 kg (167 lb 12.3 oz)     Temp Readings from Last 1 Encounters:   01/26/16 36.6  C (97.9  F) (Oral)     BP Readings from Last 6 Encounters:   01/26/16 165/75   08/11/15 113/62   04/09/15 134/82   09/23/14 128/76   09/17/13 134/73   12/30/11 127/73     Pulse Readings from Last 4 Encounters:   01/26/16 78   08/11/15 67   04/09/15 70   09/23/14 68     Resp Readings from Last 1 Encounters:   01/26/16 18     SpO2 Readings from Last 1 Encounters:   01/26/16 95%     No results for input(s): NA, POTASSIUM, CHLORIDE, CO2, ANIONGAP, GLC, BUN, CR, MICHEL in the last 51915 hours.  No results for input(s): AST, ALT, ALKPHOS, BILITOTAL, LIPASE in the last 57515 hours.  No results for input(s): WBC, HGB, PLT in the last 25799 hours.  No results for input(s): ABO, RH in the last 13779 hours.  No results for input(s): INR, PTT in the last 62451 hours.   No results for input(s): TROPI in the last 66628 hours.  No results for input(s): PH, PCO2, PO2, HCO3 in the last 59132 hours.  No results for input(s): HCG in the last 18259 hours.  No results found for this or any previous visit (from the past 744 hour(s)).    RECENT LABS:   ECG:   ECHO:                       .

## 2017-12-18 NOTE — IP AVS SNAPSHOT
[facility]    [address]    [place] [address]    Phone:  [phone]    Fax:  [phone]                                       After Visit Summary   12/18/2017    Mercedes Hairston    MRN: [id number]           After Visit Summary Signature Page     I have received my discharge instructions, and my questions have been answered. I have discussed any challenges I see with this plan with the nurse or doctor.    ..........................................................................................................................................  Patient/Patient Representative Signature      ..........................................................................................................................................  Patient Representative Print Name and Relationship to Patient    ..................................................               ................................................  Date                                            Time    ..........................................................................................................................................  Reviewed by Signature/Title    ...................................................              ..............................................  Date                                                            Time

## 2018-01-05 ENCOUNTER — TRANSFERRED RECORDS (OUTPATIENT)
Dept: HEALTH INFORMATION MANAGEMENT | Facility: CLINIC | Age: 68
End: 2018-01-05

## 2018-01-15 ENCOUNTER — ANESTHESIA EVENT (OUTPATIENT)
Dept: SURGERY | Facility: CLINIC | Age: 68
End: 2018-01-15
Payer: MEDICARE

## 2018-01-15 ENCOUNTER — HOSPITAL ENCOUNTER (OUTPATIENT)
Facility: CLINIC | Age: 68
Discharge: HOME | End: 2018-01-15
Attending: OPHTHALMOLOGY | Admitting: OPHTHALMOLOGY
Payer: MEDICARE

## 2018-01-15 ENCOUNTER — ANESTHESIA (OUTPATIENT)
Dept: SURGERY | Facility: CLINIC | Age: 68
End: 2018-01-15
Payer: MEDICARE

## 2018-01-15 ENCOUNTER — SURGERY (OUTPATIENT)
Age: 68
End: 2018-01-15

## 2018-01-15 VITALS
OXYGEN SATURATION: 98 % | TEMPERATURE: 97.6 F | RESPIRATION RATE: 16 BRPM | SYSTOLIC BLOOD PRESSURE: 134 MMHG | DIASTOLIC BLOOD PRESSURE: 70 MMHG

## 2018-01-15 PROCEDURE — 25000125 ZZHC RX 250: Performed by: NURSE ANESTHETIST, CERTIFIED REGISTERED

## 2018-01-15 PROCEDURE — 36000104 ZZH EYE SURGERY LEVEL 4 1ST 30 MIN: Performed by: OPHTHALMOLOGY

## 2018-01-15 PROCEDURE — 25000128 H RX IP 250 OP 636: Performed by: ANESTHESIOLOGY

## 2018-01-15 PROCEDURE — 71000028 ZZH EYE RECOVERY PHASE 2 EACH 15 MINS: Performed by: OPHTHALMOLOGY

## 2018-01-15 PROCEDURE — 36000105 ZZH EYE SURGERY LEVEL 4 EA 15 ADDTL MIN: Performed by: OPHTHALMOLOGY

## 2018-01-15 PROCEDURE — 66711 ECP CILIARY BODY DESTRUCTION: CPT | Performed by: OPHTHALMOLOGY

## 2018-01-15 PROCEDURE — 25000128 H RX IP 250 OP 636: Performed by: OPHTHALMOLOGY

## 2018-01-15 PROCEDURE — 27210794 ZZH OR GENERAL SUPPLY STERILE: Performed by: OPHTHALMOLOGY

## 2018-01-15 PROCEDURE — 25000128 H RX IP 250 OP 636: Performed by: NURSE ANESTHETIST, CERTIFIED REGISTERED

## 2018-01-15 PROCEDURE — 40000170 ZZH STATISTIC PRE-PROCEDURE ASSESSMENT II: Performed by: OPHTHALMOLOGY

## 2018-01-15 PROCEDURE — 25000125 ZZHC RX 250: Performed by: ANESTHESIOLOGY

## 2018-01-15 PROCEDURE — V2632 POST CHMBR INTRAOCULAR LENS: HCPCS | Performed by: OPHTHALMOLOGY

## 2018-01-15 PROCEDURE — 37000009 ZZH ANESTHESIA TECHNICAL FEE, EACH ADDTL 15 MIN: Performed by: OPHTHALMOLOGY

## 2018-01-15 PROCEDURE — 37000008 ZZH ANESTHESIA TECHNICAL FEE, 1ST 30 MIN: Performed by: OPHTHALMOLOGY

## 2018-01-15 PROCEDURE — 25000125 ZZHC RX 250: Performed by: OPHTHALMOLOGY

## 2018-01-15 DEVICE — EYE IMP IOL ALCON PCL SN60WF ACRYSOF IQ 20.5: Type: IMPLANTABLE DEVICE | Site: EYE | Status: FUNCTIONAL

## 2018-01-15 RX ORDER — TRIAMCINOLONE ACETONIDE 40 MG/ML
INJECTION, SUSPENSION INTRA-ARTICULAR; INTRAMUSCULAR PRN
Status: DISCONTINUED | OUTPATIENT
Start: 2018-01-15 | End: 2018-01-15 | Stop reason: HOSPADM

## 2018-01-15 RX ORDER — PHENYLEPHRINE HYDROCHLORIDE 25 MG/ML
1 SOLUTION/ DROPS OPHTHALMIC
Status: COMPLETED | OUTPATIENT
Start: 2018-01-15 | End: 2018-01-15

## 2018-01-15 RX ORDER — TETRACAINE HYDROCHLORIDE 5 MG/ML
SOLUTION OPHTHALMIC PRN
Status: DISCONTINUED | OUTPATIENT
Start: 2018-01-15 | End: 2018-01-15 | Stop reason: HOSPADM

## 2018-01-15 RX ORDER — SODIUM CHLORIDE, SODIUM LACTATE, POTASSIUM CHLORIDE, CALCIUM CHLORIDE 600; 310; 30; 20 MG/100ML; MG/100ML; MG/100ML; MG/100ML
INJECTION, SOLUTION INTRAVENOUS CONTINUOUS
Status: DISCONTINUED | OUTPATIENT
Start: 2018-01-15 | End: 2018-01-15 | Stop reason: HOSPADM

## 2018-01-15 RX ORDER — OMEGA-3 FATTY ACIDS/FISH OIL 300-1000MG
CAPSULE ORAL
COMMUNITY
End: 2024-09-27

## 2018-01-15 RX ORDER — TROPICAMIDE 10 MG/ML
1 SOLUTION/ DROPS OPHTHALMIC
Status: COMPLETED | OUTPATIENT
Start: 2018-01-15 | End: 2018-01-15

## 2018-01-15 RX ORDER — LIDOCAINE HYDROCHLORIDE 10 MG/ML
INJECTION, SOLUTION EPIDURAL; INFILTRATION; INTRACAUDAL; PERINEURAL PRN
Status: DISCONTINUED | OUTPATIENT
Start: 2018-01-15 | End: 2018-01-15 | Stop reason: HOSPADM

## 2018-01-15 RX ORDER — BALANCED SALT SOLUTION 6.4; .75; .48; .3; 3.9; 1.7 MG/ML; MG/ML; MG/ML; MG/ML; MG/ML; MG/ML
SOLUTION OPHTHALMIC PRN
Status: DISCONTINUED | OUTPATIENT
Start: 2018-01-15 | End: 2018-01-15 | Stop reason: HOSPADM

## 2018-01-15 RX ORDER — SODIUM CHLORIDE, SODIUM LACTATE, POTASSIUM CHLORIDE, CALCIUM CHLORIDE 600; 310; 30; 20 MG/100ML; MG/100ML; MG/100ML; MG/100ML
INJECTION, SOLUTION INTRAVENOUS CONTINUOUS PRN
Status: DISCONTINUED | OUTPATIENT
Start: 2018-01-15 | End: 2018-01-15

## 2018-01-15 RX ADMIN — PHENYLEPHRINE HYDROCHLORIDE 1 DROP: 2.5 SOLUTION/ DROPS OPHTHALMIC at 11:07

## 2018-01-15 RX ADMIN — MIDAZOLAM 2 MG: 1 INJECTION INTRAMUSCULAR; INTRAVENOUS at 13:53

## 2018-01-15 RX ADMIN — EPINEPHRINE 500 ML: 1 INJECTION, SOLUTION, CONCENTRATE INTRAVENOUS at 14:15

## 2018-01-15 RX ADMIN — MIDAZOLAM 1 MG: 1 INJECTION INTRAMUSCULAR; INTRAVENOUS at 14:05

## 2018-01-15 RX ADMIN — TETRACAINE HYDROCHLORIDE 1 DROP: 5 SOLUTION OPHTHALMIC at 14:11

## 2018-01-15 RX ADMIN — DEXMEDETOMIDINE HYDROCHLORIDE 12 MCG: 100 INJECTION, SOLUTION INTRAVENOUS at 13:53

## 2018-01-15 RX ADMIN — TRIAMCINOLONE ACETONIDE 0.1 ML: 40 INJECTION, SUSPENSION INTRA-ARTICULAR; INTRAMUSCULAR at 14:41

## 2018-01-15 RX ADMIN — PHENYLEPHRINE HYDROCHLORIDE 1 DROP: 2.5 SOLUTION/ DROPS OPHTHALMIC at 11:11

## 2018-01-15 RX ADMIN — MIDAZOLAM 1 MG: 1 INJECTION INTRAMUSCULAR; INTRAVENOUS at 14:06

## 2018-01-15 RX ADMIN — Medication 0.1 ML: at 14:34

## 2018-01-15 RX ADMIN — SODIUM CHLORIDE, POTASSIUM CHLORIDE, SODIUM LACTATE AND CALCIUM CHLORIDE: 600; 310; 30; 20 INJECTION, SOLUTION INTRAVENOUS at 12:47

## 2018-01-15 RX ADMIN — TROPICAMIDE 1 DROP: 10 SOLUTION/ DROPS OPHTHALMIC at 11:26

## 2018-01-15 RX ADMIN — TETRACAINE HYDROCHLORIDE 1 DROP: 5 SOLUTION OPHTHALMIC at 14:00

## 2018-01-15 RX ADMIN — LIDOCAINE HYDROCHLORIDE 0.5 ML: 20 INJECTION, SOLUTION EPIDURAL; INFILTRATION; INTRACAUDAL; PERINEURAL at 14:10

## 2018-01-15 RX ADMIN — SODIUM CHLORIDE, POTASSIUM CHLORIDE, SODIUM LACTATE AND CALCIUM CHLORIDE: 600; 310; 30; 20 INJECTION, SOLUTION INTRAVENOUS at 11:26

## 2018-01-15 RX ADMIN — PHENYLEPHRINE HYDROCHLORIDE 1 DROP: 2.5 SOLUTION/ DROPS OPHTHALMIC at 11:26

## 2018-01-15 RX ADMIN — BALANCED SALT SOLUTION 15 ML: 6.4; .75; .48; .3; 3.9; 1.7 SOLUTION OPHTHALMIC at 14:11

## 2018-01-15 RX ADMIN — Medication 0.1 ML: at 14:36

## 2018-01-15 RX ADMIN — LIDOCAINE HYDROCHLORIDE 1 ML: 10 INJECTION, SOLUTION EPIDURAL; INFILTRATION; INTRACAUDAL; PERINEURAL at 14:35

## 2018-01-15 RX ADMIN — SODIUM CHONDROITIN SULFATE / SODIUM HYALURONATE 1 ML: 0.55-0.5 INJECTION INTRAOCULAR at 14:35

## 2018-01-15 RX ADMIN — LIDOCAINE HYDROCHLORIDE 1 ML: 10 INJECTION, SOLUTION EPIDURAL; INFILTRATION; INTRACAUDAL; PERINEURAL at 11:26

## 2018-01-15 RX ADMIN — DEXMEDETOMIDINE HYDROCHLORIDE 8 MCG: 100 INJECTION, SOLUTION INTRAVENOUS at 14:02

## 2018-01-15 RX ADMIN — EPINEPHRINE 0.5 ML: 1 INJECTION, SOLUTION, CONCENTRATE INTRAVENOUS at 14:11

## 2018-01-15 RX ADMIN — TROPICAMIDE 1 DROP: 10 SOLUTION/ DROPS OPHTHALMIC at 11:07

## 2018-01-15 RX ADMIN — TROPICAMIDE 1 DROP: 10 SOLUTION/ DROPS OPHTHALMIC at 11:11

## 2018-01-15 ASSESSMENT — COPD QUESTIONNAIRES: COPD: 1

## 2018-01-15 NOTE — ANESTHESIA POSTPROCEDURE EVALUATION
Patient: Mercedes Hairston    Procedure(s):  RIGHT EYE PHACOEMULSIFICATION CLEAR CORNEA WITH STANDARD INTRAOCULAR LENS IMPLANT, ENDOSCOPIC CYCLOPHOTOCOAGULATION  - Wound Class: I-Clean    Diagnosis:CATARACT, GLAUCOMA  Diagnosis Additional Information: No value filed.    Anesthesia Type:  MAC    Note:  Anesthesia Post Evaluation    Patient location during evaluation: PACU  Patient participation: Able to fully participate in evaluation  Level of consciousness: awake and alert  Pain management: satisfactory to patient  Airway patency: patent  Cardiovascular status: hemodynamically stable  Respiratory status: acceptable and unassisted  Hydration status: balanced  PONV: none     Anesthetic complications: None          Last vitals:  Vitals:    01/15/18 1117 01/15/18 1448 01/15/18 1516   BP: 149/89 125/66 134/70   Resp: 16 16 16   Temp: 36.4  C (97.6  F)     SpO2: 97% 98% 98%         Electronically Signed By: Ja Toledo MD  January 15, 2018  4:49 PM

## 2018-01-15 NOTE — ANESTHESIA CARE TRANSFER NOTE
Patient: Mercedes Hairston    Procedure(s):  RIGHT EYE PHACOEMULSIFICATION CLEAR CORNEA WITH STANDARD INTRAOCULAR LENS IMPLANT, ENDOSCOPIC CYCLOPHOTOCOAGULATION  - Wound Class: I-Clean    Diagnosis: CATARACT, GLAUCOMA  Diagnosis Additional Information: No value filed.    Anesthesia Type:   MAC     Note:  Airway :Room Air  Patient transferred to:PACU  Comments: VSS. Airway and IV patent. Patient comfortable. Report to RN. Stable care transfer.Handoff Report: Identifed the Patient, Identified the Reponsible Provider, Reviewed the pertinent medical history, Discussed the surgical course, Reviewed Intra-OP anesthesia mangement and issues during anesthesia, Set expectations for post-procedure period and Allowed opportunity for questions and acknowledgement of understanding      Vitals: (Last set prior to Anesthesia Care Transfer)    CRNA VITALS  1/15/2018 1416 - 1/15/2018 1450      1/15/2018             Resp Rate (set): 10                Electronically Signed By: LOUIS Shaw CRNA  January 15, 2018  2:50 PM

## 2018-01-15 NOTE — DISCHARGE INSTRUCTIONS
Discharge Instructions  Post-Operative Cataract and Glacoma Surgery  Dr. Palacios  Tel: [phone] [address], [address]  [place] [address]      Pain Management:      Some mild discomfort is normal following surgery.  If you are currently taking any medications for pain, you may continue to take what you normally do.  Otherwise, you may take an over-the-counter medication such as Tylenol (acetaminophen) or ibuprofen (Advil) for relief.  Take as instructed on the bottle.    If you are experiencing uncontrollable pain or have other concerns, call [phone].    Other Medications:    No eye drops to the operative eye tonight.  Please remember to bring all your drops/supplies with you to your post-op appointment.  You will be instructed on the drops at that time.    If you currently take pills to treat glaucoma, continue as normal.    You may resume your regular home medications, including any drops you are using in your NON-operative eye.      General Rules:    Avoid strenuous activity. Do not do anything that would cause you to strain or make your face red. Open your mouth if you cough, use a laxative if necessary, do not lift greater than 5 pounds.    Keep your head above your heart.  Do not bend down lower than waist level.  Bend with your knees.    Keep the bandage over your eye until tomorrow morning and you can remove it prior to your appointment tomorrow.  Keep clear plastic eye shield to wear when you are sleeping at night.  The doctor will remove the bandage at your appointment at the clinic.    Try to write down any questions you may have to bring to your post-op appointment.    Be restful today.    [facility] Discharge  Instructions  Anesthesia ([facility])   Adult Discharge Instructions    For 24 hours after surgery    1. Get plenty of rest.  Make arrangements to have a responsible adult stay with you for at least 6 hours after you leave the  [facility].  2. Do not drive or use heavy equipment for 24 hours.    3. Do not drink alcohol for 24 hours.  4. Do not sign legal documents or make important decisions for 24 hours.  5. Avoid strenuous or risky activities. You may feel lightheaded.  If so, sit for a few minutes before standing.  Have someone help you get up.   6. Conscious sedation patients may resume a regular diet..  7. Any questions of medical nature, call your physician.

## 2018-01-15 NOTE — ANESTHESIA PREPROCEDURE EVALUATION
Anesthesia Evaluation     .             ROS/MED HX    ENT/Pulmonary:     (+)COPD, , . .   (-) sleep apnea   Neurologic:  - neg neurologic ROS     Cardiovascular:  - neg cardiovascular ROS       METS/Exercise Tolerance:     Hematologic:  - neg hematologic  ROS       Musculoskeletal:  - neg musculoskeletal ROS       GI/Hepatic:        (-) GERD   Renal/Genitourinary:  - ROS Renal section negative       Endo:  - neg endo ROS       Psychiatric:  - neg psychiatric ROS       Infectious Disease:         Malignancy:   (+) Malignancy History of Breast          Other:                     Physical Exam  Normal systems: cardiovascular, pulmonary and dental    Airway   Mallampati: II  TM distance: >3 FB  Neck ROM: full    Dental     Cardiovascular       Pulmonary                         Anesthesia Plan      History & Physical Review  History and physical reviewed and following examination; no interval change.    ASA Status:  2 .    NPO Status:  > 8 hours    Plan for MAC Reason for MAC:  Procedure to face, neck, head or breast  PONV prophylaxis:  Ondansetron (or other 5HT-3)       Postoperative Care  Postoperative pain management:  IV analgesics.      Consents  Anesthetic plan, risks, benefits and alternatives discussed with:  Patient..        Procedure: Procedure(s):  COMBINED PHACOEMULSIFICATION CLEAR CORNEA WITH STANDARD INTRAOCULAR LENS IMPLANT, ENDOSCOPIC CYCLOPHOTOCOAGULATION  Preop diagnosis: CATARACT, GLAUCOMA    Allergies   Allergen Reactions     Codeine      Ofloxacin      Sulfa Drugs      Timolol      Serevent [Salmeterol] Hives     Past Medical History:   Diagnosis Date     Breast cancer (H)      COPD (chronic obstructive pulmonary disease) (H)      Past Surgical History:   Procedure Laterality Date     BIOPSY       COLONOSCOPY       PHACOEMULSIFICATION CLEAR CORNEA W/ STANDARD IOL IMPLANT, ENDOSCOPIC CYCLOPHOTOCOAGULATION, COMBINED Left 12/18/2017    Procedure: COMBINED PHACOEMULSIFICATION CLEAR CORNEA WITH STANDARD  INTRAOCULAR LENS IMPLANT, ENDOSCOPIC CYCLOPHOTOCOAGULATION;  COMBINED PHACOEMULSIFICATION CLEAR CORNEA WITH STANDARD INTRAOCULAR LENS IMPLANT, ENDOSCOPIC CYCLOPHOTOCOAGULATION ;  Surgeon: Connie Palacios MD;  Location: [facility]     Prior to Admission medications    Medication Sig Start Date End Date Taking? Authorizing Provider   prednisoLONE acetate (PRED FORTE) 1 % ophthalmic susp Place 1 drop Into the left eye 4 times daily 12/18/17 1/17/18  Connie Palacios MD   saw palmetto 450 MG CAPS capsule Take 450 mg by mouth 3 times daily    Reported, Patient   red yeast rice 600 MG CAPS Take 600 mg by mouth daily    Reported, Patient   exemestane (AROMASIN) 25 MG tablet Take 25 mg by mouth daily    Reported, Patient   fluticasone-salmeterol (ADVAIR DISKUS) 250-50 MCG/DOSE diskus inhaler INHALE 1 PUFF INTO THE LUNGS 2 TIMES DAILY 5/17/16   Perlman, David Morris, MD   XOPENEX HFA 45 MCG/ACT inhaler INHALE 1-2 PUFFS INTO THE LUNGS EVERY 6 HOURS AS NEEDED FOR SHORTNESS OF BREATH / DYSPNEA 3/28/16   Perlman, David Morris, MD   oseltamivir (TAMIFLU) 75 MG capsule Take 1 capsule (75 mg) by mouth 2 times daily 2/3/16   Perlman, David Morris, MD   order for DME Equipment being ordered: Portable travelling Nebulizer 1/26/16   Perlman, David Morris, MD   TRIAMTERENE PO     Reported, Patient   tiotropium (SPIRIVA RESPIMAT) 2.5 MCG/ACT inhalation aerosol Inhale 2 puffs into the lungs daily 8/11/15   Perlman, David Morris, MD   ORDER FOR DME Equipment being ordered: Nebulizer - Portable for travel. 4/9/15   Perlman, David Morris, MD   levalbuterol (XOPENEX CONC) 1.25 MG/0.5ML NEBU Take 0.5 mLs (1.25 mg) by nebulization every 6 hours as needed for wheezing 1/15/15   Perlman, David Morris, MD   ipratropium (ATROVENT HFA) 17 MCG/ACT inhaler Inhale 2 puffs into the lungs every 6 hours 1/13/15   Perlman, David Morris, MD   metoprolol (LOPRESSOR) 25 MG tablet Take 25 mg by mouth 2 times daily.    Reported, Patient     Current  Facility-Administered Medications Ordered in Epic   Medication Dose Route Frequency Last Rate Last Dose     lactated ringers infusion   Intravenous Continuous         tropicamide (MYDRIACYL) 1 % ophthalmic solution 1 drop  1 drop Ophthalmic q5 Min Prior to Surgery   1 drop at 01/15/18 1107     phenylephrine (MYDFRIN /JUNG-SYNEPHRINE) 2.5 % ophthalmic solution 1 drop  1 drop Ophthalmic q5 Min Prior to Surgery   1 drop at 01/15/18 1107     bupivacaine 0.75% (pf) 4.5mL + lidocaine 2% (pf) 4.5mL + hyaluronidase (HYLENEX) 150 units   Retrobulbar Once         lidocaine 1 % 1 mL  1 mL Other Q1H PRN         lactated ringers infusion   Intravenous Continuous         No current [facility]-ordered outpatient prescriptions on file.     Wt Readings from Last 1 Encounters:   12/15/17 76.1 kg (167 lb 12.3 oz)     Temp Readings from Last 1 Encounters:   12/18/17 35.9  C (96.6  F) (Temporal)     BP Readings from Last 6 Encounters:   12/18/17 127/61   01/26/16 165/75   08/11/15 113/62   04/09/15 134/82   09/23/14 128/76   09/17/13 134/73     Pulse Readings from Last 4 Encounters:   01/26/16 78   08/11/15 67   04/09/15 70   09/23/14 68     Resp Readings from Last 1 Encounters:   12/18/17 16     SpO2 Readings from Last 1 Encounters:   12/18/17 97%     No results for input(s): NA, POTASSIUM, CHLORIDE, CO2, ANIONGAP, GLC, BUN, CR, MICHEL in the last 15501 hours.  No results for input(s): WBC, HGB, PLT in the last 22880 hours.  No results for input(s): INR in the last 02453 hours.    Invalid input(s): APTT   RECENT LABS:   ECG:   ECHO:   CXR:                        .

## 2018-01-15 NOTE — IP AVS SNAPSHOT
[facility]    [address]    [place] [address]    Phone:  [phone]    Fax:  [phone]                                       After Visit Summary   1/15/2018    Mercedes Hairston    MRN: [id number]           After Visit Summary Signature Page     I have received my discharge instructions, and my questions have been answered. I have discussed any challenges I see with this plan with the nurse or doctor.    ..........................................................................................................................................  Patient/Patient Representative Signature      ..........................................................................................................................................  Patient Representative Print Name and Relationship to Patient    ..................................................               ................................................  Date                                            Time    ..........................................................................................................................................  Reviewed by Signature/Title    ...................................................              ..............................................  Date                                                            Time

## 2018-01-15 NOTE — IP AVS SNAPSHOT
MRN:[id number]                      After Visit Summary   1/15/2018    Mercedes Hairston    MRN: [id number]           Thank you!     Thank you for choosing [facility] for your care. Our goal is always to provide you with excellent care. Hearing back from our patients is one way we can continue to improve our services. Please take a few minutes to complete the written survey that you may receive in the mail after you visit with us. Thank you!        Patient Information     Date Of Birth          1950        About your hospital stay     You were admitted on:  January 15, 2018 You last received care in the:  [facility]    You were discharged on:  January 15, 2018       Who to Call     For medical emergencies, please call 911.  For non-urgent questions about your medical care, please call your primary care provider or clinic, [phone]  For questions related to your surgery, please call your surgery clinic        Attending Provider     Provider Connie Severino MD Ophthalmology       Primary Care Provider Office Phone # Fax #    Anuel Kelly [phone]      After Care Instructions     Eye drops as prescribed by physician.  Start drops today unless told otherwise by the physician           May use Tylenol or Advil for pain as directed by the physician           Notify Physician if you have severe headache or nausea           Remove patch per physician instruction           Return to clinic as instructed by physician           Wear eye shield or patch as directed                 Further instructions from your care team           Discharge Instructions  Post-Operative Cataract and Glacoma Surgery  Dr. Palacios  Tel: [phone] [address], [address]  [place] [address]      Pain Management:      Some mild discomfort is normal following surgery.  If you are currently taking any medications for pain, you may continue to  take what you normally do.  Otherwise, you may take an over-the-counter medication such as Tylenol (acetaminophen) or ibuprofen (Advil) for relief.  Take as instructed on the bottle.    If you are experiencing uncontrollable pain or have other concerns, call [phone].    Other Medications:    No eye drops to the operative eye tonight.  Please remember to bring all your drops/supplies with you to your post-op appointment.  You will be instructed on the drops at that time.    If you currently take pills to treat glaucoma, continue as normal.    You may resume your regular home medications, including any drops you are using in your NON-operative eye.      General Rules:    Avoid strenuous activity. Do not do anything that would cause you to strain or make your face red. Open your mouth if you cough, use a laxative if necessary, do not lift greater than 5 pounds.    Keep your head above your heart.  Do not bend down lower than waist level.  Bend with your knees.    Keep the bandage over your eye until tomorrow morning and you can remove it prior to your appointment tomorrow.  Keep clear plastic eye shield to wear when you are sleeping at night.  The doctor will remove the bandage at your appointment at the clinic.    Try to write down any questions you may have to bring to your post-op appointment.    Be restful today.    [facility] Discharge  Instructions  Anesthesia (Eye Care Center)   Adult Discharge Instructions    For 24 hours after surgery    1. Get plenty of rest.  Make arrangements to have a responsible adult stay with you for at least 6 hours after you leave the hospital.  2. Do not drive or use heavy equipment for 24 hours.    3. Do not drink alcohol for 24 hours.  4. Do not sign legal documents or make important decisions for 24 hours.  5. Avoid strenuous or risky activities. You may feel lightheaded.  If so, sit for a few minutes before standing.  Have someone help you  "get up.   6. Conscious sedation patients may resume a regular diet..  7. Any questions of medical nature, call your physician.    Pending Results     No orders found from 2018 to 2018.            Admission Information     Date & Time Provider Department Dept. Phone    1/15/2018 Connie Palacios MD [facility] [phone]      Your Vitals Were     Blood Pressure Temperature Respirations Pulse Oximetry          149/89 97.6  F (36.4  C) (Temporal) 16 97%        MyChart Information     [facility] lets you send messages to your doctor, view your test results, renew your prescriptions, schedule appointments and more. To sign up, go to [email or web address] . Click on \"Log in\" on the left side of the screen, which will take you to the Welcome page. Then click on \"Sign up Now\" on the right side of the page.     You will be asked to enter the access code listed below, as well as some personal information. Please follow the directions to create your username and password.     Your access code is: [email or web address]  Expires: 3/18/2018 12:35 PM     Your access code will  in 90 days. If you need help or a new code, please call your [facility] clinic or [phone].        Care EveryWhere ID     This is your Care EveryWhere ID. This could be used by other organizations to access your [facility] medical records  [id number]        Equal Access to Services     JUS HAMLIN AH: Hadii katy royalo Soamberali, waaxda luqadaha, qaybta kaalmada adeegyada, waxay nicci hart aderod koch . So [facility] [phone].    ATENCIÓN: Si habla español, tiene a willett disposición servicios gratuitos de asistencia lingüística. Llame al [phone].    We comply with applicable federal civil rights laws and [place] laws. We do not discriminate on the basis of race, color, national origin, age, disability, sex, sexual orientation, or gender identity.               Review of your medicines      UNREVIEWED " medicines. Ask your doctor about these medicines        Dose / Directions    ASPIRIN PO        Dose:  81 mg   Take 81 mg by mouth daily   Refills:  0       exemestane 25 MG tablet   Commonly known as:  AROMASIN        Dose:  25 mg   Take 25 mg by mouth daily   Refills:  0       fluticasone-salmeterol 250-50 MCG/DOSE diskus inhaler   Commonly known as:  ADVAIR DISKUS   Used for:  COPD (chronic obstructive pulmonary disease) (H)        INHALE 1 PUFF INTO THE LUNGS 2 TIMES DAILY   Quantity:  1 Inhaler   Refills:  12       ipratropium 17 MCG/ACT Inhaler   Commonly known as:  ATROVENT HFA   Used for:  COPD (chronic obstructive pulmonary disease) (H)        Dose:  2 puff   Inhale 2 puffs into the lungs every 6 hours   Quantity:  1 Inhaler   Refills:  12       levalbuterol 1.25 MG/0.5ML Nebu neb solution   Commonly known as:  XOPENEX CONC   Used for:  COPD exacerbation (H)        Dose:  1.25 mg   Take 0.5 mLs (1.25 mg) by nebulization every 6 hours as needed for wheezing   Quantity:  60 mL   Refills:  1       metoprolol tartrate 25 MG tablet   Commonly known as:  LOPRESSOR        Dose:  25 mg   Take 25 mg by mouth 2 times daily.   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        Dose:  1 drop   Place 1 drop Into the left eye 4 times daily   Quantity:  6 mL   Refills:  0       red yeast rice 600 MG Caps        Dose:  600 mg   Take 600 mg by mouth daily   Refills:  0       saw palmetto 450 MG Caps capsule        Dose:  450 mg   Take 450 mg by mouth 3 times daily   Refills:  0       tiotropium 2.5 MCG/ACT inhalation aerosol   Commonly known as:  SPIRIVA  RESPIMAT   Used for:  COPD (chronic obstructive pulmonary disease) (H)        Dose:  2 puff   Inhale 2 puffs into the lungs daily   Quantity:  12 g   Refills:  11       TRIAMTERENE PO        Refills:  0       VITAMIN D (CHOLECALCIFEROL) PO        Dose:  1000 Units   Take 1,000 Units by mouth daily   Refills:  0       XOPENEX HFA 45 MCG/ACT Inhaler   Used for:  Chronic obstructive pulmonary disease with acute exacerbation (H)   Generic drug:  levalbuterol        INHALE 1-2 PUFFS INTO THE LUNGS EVERY 6 HOURS AS NEEDED FOR SHORTNESS OF BREATH / DYSPNEA   Quantity:  3 Inhaler   Refills:  3         CONTINUE these medicines which have NOT CHANGED        Dose / Directions    * order for DME   Used for:  COPD (chronic obstructive pulmonary disease) (H)        Equipment being ordered: Nebulizer - Portable for travel.   Quantity:  1 Device   Refills:  0       * order for DME   Used for:  Chronic obstructive pulmonary disease, unspecified COPD type (H)        Equipment being ordered: Portable travelling Nebulizer   Quantity:  1 Device   Refills:  0       * Notice:  This list has 2 medication(s) that are the same as other medications prescribed for you. Read the directions carefully, and ask your doctor or other care provider to review them with you.             Protect others around you: Learn how to safely use, store and throw away your medicines at www.disposemymeds.org.             Medication List: This is a list of all your medications and when to take them. Check marks below indicate your daily home schedule. Keep this list as a reference.      Medications           Morning Afternoon Evening Bedtime As Needed    ASPIRIN PO   Take 81 mg by mouth daily                                exemestane 25 MG tablet   Commonly known as:  AROMASIN   Take 25 mg by mouth daily                                fluticasone-salmeterol 250-50 MCG/DOSE diskus inhaler   Commonly known as:  ADVAIR DISKUS   INHALE 1 PUFF INTO THE LUNGS 2 TIMES  DAILY                                ipratropium 17 MCG/ACT Inhaler   Commonly known as:  ATROVENT HFA   Inhale 2 puffs into the lungs every 6 hours                                levalbuterol 1.25 MG/0.5ML Nebu neb solution   Commonly known as:  XOPENEX CONC   Take 0.5 mLs (1.25 mg) by nebulization every 6 hours as needed for wheezing                                metoprolol tartrate 25 MG tablet   Commonly known as:  LOPRESSOR   Take 25 mg by mouth 2 times daily.                                omega 3 1000 MG Caps                                * order for DME   Equipment being ordered: Nebulizer - Portable for travel.                                * order for DME   Equipment being ordered: Portable travelling Nebulizer                                oseltamivir 75 MG capsule   Commonly known as:  TAMIFLU   Take 1 capsule (75 mg) by mouth 2 times daily                                prednisoLONE acetate 1 % ophthalmic susp   Commonly known as:  PRED FORTE   Place 1 drop Into the left eye 4 times daily                                red yeast rice 600 MG Caps   Take 600 mg by mouth daily                                saw palmetto 450 MG Caps capsule   Take 450 mg by mouth 3 times daily                                tiotropium 2.5 MCG/ACT inhalation aerosol   Commonly known as:  SPIRIVA RESPIMAT   Inhale 2 puffs into the lungs daily                                TRIAMTERENE PO                                VITAMIN D (CHOLECALCIFEROL) PO   Take 1,000 Units by mouth daily                                XOPENEX HFA 45 MCG/ACT Inhaler   INHALE 1-2 PUFFS INTO THE LUNGS EVERY 6 HOURS AS NEEDED FOR SHORTNESS OF BREATH / DYSPNEA   Generic drug:  levalbuterol                                * Notice:  This list has 2 medication(s) that are the same as other medications prescribed for you. Read the directions carefully, and ask your doctor or other care provider to review them with you.

## 2018-01-15 NOTE — OP NOTE
Patient:    Mercedes Hairston                                                  Reg No:     [id number]                                                   Date:  January 15, 2018                                                  :   1950                                                      Attending:  TERRELL RODRIGUEZ M.D.                                                    Surgeon:    TERRELL RODRIGUEZ M.D.                                      Service Dt: Ophthalmology                                               OPERATIVE REPORT          ANESTHESIA:   Monitored anesthetic care with Sub-Tenon block of mixture of 2 cc of 2% lidocaine and 2 cc of 0.75% marcaine and hyaluronidase and topical tetracaine and intracameral preservative free lidocaine 1%      PREOPERATIVE DIAGNOSIS (ES):   1. Cataract, right eye  2. Glaucoma (plateau iris), right eye     POSTOPERATIVE DIAGNOSIS (ES):   The same     NAME OF OPERATION:   1. Phacoemulsification and intraocular lens implant, right eye  IOL model: [id number]  IOl power: 20.5  IOL serial number: [id number]    2. Endoscopic cyclophotocoagulation,  right eye, settings: Energy 0.3, mode continuous, area treated 270 degree    COMPLICATIONS:  None    Blood loss:  None    SPECIMENS REMOVED:  None    INDICATIONS FOR PROCEDURE:  The patient is a 67 year old female. The patient was evaluated in the office and diagnosed with visually significant cataract and glaucoma (plateau iris) in the  right eye.  The benefits, alternatives, and risks of the procedure were discussed with the patient, including the risk of infection, inflammation, bleeding, blindness, need for another procedure, and elevated intraocular pressure, need for additional glaucoma eye drops or surgery. The patient understood the benefits, alternatives and risks and has elected to proceed with the surgery.     PROCEDURE: After appropriate pre-operative consent was obtained the patient was brought to the OR and anesthesia team  hooked up monitoring. The time out was taken to identify the patient, surgery, the implant and eye. The biometry and IOL calculations of the operative eye done in 2016 were reviewed and appropriate IOL was selected. After that the anesthesia team induced MAC. The eye was prepped and draped in sterile ophthalmic fashion. Tetracaine drops were placed in the eye. The lid speculum was placed in the eye. Sub-Tenon block was given in standard fashion. Paracentesis was performed with paracentesis knife. The AC was filled with preservative free lidocaine 1% and Viscoat. The main wound was constructed temporally with 2.4 mm keratome. The capsulorhexis was started with a cystotome and completed with Utratta forcepts. Hydrodissection was performed with a blunt tip cannula and balanced salt solution. Phacoemulsification was used to remove the nucleous. Irrigation/aspiration were used to remove the cortex. The capsular bag was re-filled with Provisc. The intraocular lens was injected into the capsular bag. Irrigation/aspiration were used to remove viscoelastic.   After that the attention was brought to endoscopic cyclophotocoagulation. Sulcus was filled with viscoelastic. Endoscopic cyclophotocoagulation was performed using above settings. Irrigation/aspiration were used to remove viscoelastic. 0.1 cc of cefuroxime was injected intra-camerally.  Both wounds were hydrated and checked for leak using weck-cells. Tissue glue was placed on the corneal wounds. AC was re-formed with balanced salt solution.   At the end of the surgery the AC was deep, and no leak was identified. 0.2 mg of 40 mg/cc kenalog was injected into inferior subconjunctival space. The lid speculum was removed. Maxitrol ointment was placed in the eye. The eye was patched and shielded in standard ophthalmic fashion.     DISPOSITION: The patient was taken to the recovery room in stable condition having tolerated the procedure well. The patient will be given  post-operative instructions and seen in the eye clinic tomorrow.      SPONGE/INSTRUMENT/NEEDLE COUNTS:   All sponge and needle counts were correct at the end of the case.     ____________________________   Connie RODRIGUEZ M.D.

## 2018-04-02 ENCOUNTER — RADIANT APPOINTMENT (OUTPATIENT)
Dept: GENERAL RADIOLOGY | Facility: CLINIC | Age: 68
End: 2018-04-02
Attending: ORTHOPAEDIC SURGERY
Payer: COMMERCIAL

## 2018-04-02 ENCOUNTER — OFFICE VISIT (OUTPATIENT)
Dept: ORTHOPEDICS | Facility: CLINIC | Age: 68
End: 2018-04-02
Payer: COMMERCIAL

## 2018-04-02 VITALS — WEIGHT: 169.9 LBS | BODY MASS INDEX: 27.31 KG/M2 | HEIGHT: 66 IN

## 2018-04-02 DIAGNOSIS — S62.92XA FRACTURE OF LEFT HAND: ICD-10-CM

## 2018-04-02 DIAGNOSIS — S62.661D CLOSED NONDISPLACED FRACTURE OF DISTAL PHALANX OF LEFT INDEX FINGER WITH ROUTINE HEALING: Primary | ICD-10-CM

## 2018-04-02 DIAGNOSIS — S62.92XA FRACTURE OF LEFT HAND: Primary | ICD-10-CM

## 2018-04-02 RX ORDER — BACITRACIN 500 UNIT/G
OINTMENT (GRAM) TOPICAL
COMMUNITY
Start: 2017-08-14 | End: 2024-09-27

## 2018-04-02 RX ORDER — ONDANSETRON 4 MG/1
4 TABLET, ORALLY DISINTEGRATING ORAL
COMMUNITY
Start: 2018-02-01

## 2018-04-02 RX ORDER — FLAVORING AGENT
250 OIL (ML) MISCELLANEOUS
COMMUNITY

## 2018-04-02 RX ORDER — CARVEDILOL 12.5 MG/1
12.5 TABLET ORAL
COMMUNITY
Start: 2018-03-16 | End: 2024-09-27

## 2018-04-02 RX ORDER — TRIAMTERENE/HYDROCHLOROTHIAZID 37.5-25 MG
1 TABLET ORAL DAILY
COMMUNITY
Start: 2012-10-22

## 2018-04-02 RX ORDER — LOVASTATIN 40 MG
80 TABLET ORAL
COMMUNITY
Start: 2013-10-26 | End: 2024-09-27

## 2018-04-02 RX ORDER — CRANBERRY FRUIT EXTRACT 200 MG
1200 CAPSULE ORAL
COMMUNITY
Start: 2017-12-11 | End: 2024-09-27

## 2018-04-02 RX ORDER — CHLORAL HYDRATE 500 MG
CAPSULE ORAL
COMMUNITY
Start: 2013-10-26

## 2018-04-02 RX ORDER — UBIDECARENONE 100 MG
100 CAPSULE ORAL
COMMUNITY
Start: 2017-08-14

## 2018-04-02 RX ORDER — EXEMESTANE 25 MG/1
25 TABLET ORAL
COMMUNITY
Start: 2017-12-11 | End: 2024-09-27

## 2018-04-02 RX ORDER — FLUTICASONE PROPIONATE 50 MCG
SPRAY, SUSPENSION (ML) NASAL
COMMUNITY
Start: 2016-06-28 | End: 2024-09-27

## 2018-04-02 NOTE — LETTER
4/2/2018       RE: Mercedes Hairston  [address] [place] [id number]     Dear Colleague,    Thank you for referring your patient, Mercedes Hairston, to the [facility] at [facility]. Please see a copy of my visit note below.    Date of Service: Apr 2, 2018    Chief Complaint:   Chief Complaint   Patient presents with     Hand Pain     left index finger, patient states she fell out of store into the street and her hand hit a pilon the finger nail peeled back.       History of Present Illness: Mercedes Hairston is a 67 year old, right handed female who presents today for further evaluation of a left finger injury. The patient was in [place] on March 3, 2018, when she fell on a cobblestone stair case out of a store into the street. She jammed her index finger against a Pilon and experienced immediate finger pain. The distalmost nail plate was pulled back but there were no open wounds. She was subsequently diagnosed with a fracture of the distal phalanx. She was told that this fracture extended into the joint and therefore was referred to a hand surgeon for further evaluation. She reports that she has no pain at this time. The finger is healing well. She has no difficulties with motion.    Review of Systems: A 14-point review of systems was obtained on intake reviewed. It is included at the bottom of this note.     Past Medical History:   Diagnosis Date     Breast cancer (H)      COPD (chronic obstructive pulmonary disease) (H)          Past Surgical History:   Procedure Laterality Date     BIOPSY       COLONOSCOPY       PHACOEMULSIFICATION CLEAR CORNEA W/ STANDARD IOL IMPLANT, ENDOSCOPIC CYCLOPHOTOCOAGULATION, COMBINED Left 12/18/2017    Procedure: COMBINED PHACOEMULSIFICATION CLEAR CORNEA WITH STANDARD INTRAOCULAR LENS IMPLANT, ENDOSCOPIC CYCLOPHOTOCOAGULATION;  COMBINED PHACOEMULSIFICATION CLEAR CORNEA WITH STANDARD INTRAOCULAR LENS IMPLANT,  ENDOSCOPIC CYCLOPHOTOCOAGULATION ;  Surgeon: Connie Palacios MD;  Location: [facility]     PHACOEMULSIFICATION CLEAR CORNEA W/ STANDARD IOL IMPLANT, ENDOSCOPIC CYCLOPHOTOCOAGULATION, COMBINED Right 1/15/2018    Procedure: COMBINED PHACOEMULSIFICATION CLEAR CORNEA WITH STANDARD INTRAOCULAR LENS IMPLANT, ENDOSCOPIC CYCLOPHOTOCOAGULATION;  RIGHT EYE PHACOEMULSIFICATION CLEAR CORNEA WITH STANDARD INTRAOCULAR LENS IMPLANT, ENDOSCOPIC CYCLOPHOTOCOAGULATION ;  Surgeon: Connie Palacios MD;  Location: [facility]         Current Outpatient Prescriptions:      carvedilol (COREG) 12.5 MG tablet, Take 12.5 mg by mouth, Disp: , Rfl:      co-enzyme Q-10 (SM COENZYME Q-10) 100 MG CAPS capsule, Take 100 mg by mouth, Disp: , Rfl:      fluticasone (FLONASE) 50 MCG/ACT spray, , Disp: , Rfl:      ipratropium (ATROVENT) 0.02 % neb solution, Inhale 0.5 mg into the lungs, Disp: , Rfl:      fluticasone-salmeterol (ADVAIR) 250-50 MCG/DOSE diskus inhaler, Inhale 1 puff into the lungs, Disp: , Rfl:      lovastatin (MEVACOR) 40 MG tablet, Take 80 mg by mouth, Disp: , Rfl:      ondansetron (ZOFRAN-ODT) 4 MG ODT tab, Take 4 mg by mouth, Disp: , Rfl:      triamterene-hydrochlorothiazide (MAXZIDE-25) 37.5-25 MG per tablet, , Disp: , Rfl:      fluticasone-salmeterol (ADVAIR DISKUS) 100-50 MCG/DOSE diskus inhaler, , Disp: , Rfl:      Cholecalciferol (VITAMIN D3) 1000 UNITS CAPS, , Disp: , Rfl:      exemestane (AROMASIN) 25 MG tablet, Take 25 mg by mouth, Disp: , Rfl:      fish oil-omega-3 fatty acids 1000 MG capsule, , Disp: , Rfl:      Red Yeast Rice Extract 600 MG CAPS, Take 1,200 mg by mouth, Disp: , Rfl:      Saw [place] 160 MG CAPS, , Disp: , Rfl:      Tiotropium Bromide Monohydrate 1.25 MCG/ACT AERS, , Disp: , Rfl:      Flavoring Agent (GRAPEFRUIT FLAVOR) OIL, Take 250 mg by mouth, Disp: , Rfl:      Ipratropium Bromide HFA (ATROVENT HFA IN), Inhale 2 puffs into the lungs, Disp: , Rfl:      cholecalciferol (VITAMIN D3) 1000 UNIT tablet,  Take 1,000 Units by mouth, Disp: , Rfl:      VITAMIN D, CHOLECALCIFEROL, PO, Take 1,000 Units by mouth daily, Disp: , Rfl:      ASPIRIN PO, Take 81 mg by mouth daily, Disp: , Rfl:      omega 3 1000 MG CAPS, , Disp: , Rfl:      saw palmetto 450 MG CAPS capsule, Take 450 mg by mouth 3 times daily, Disp: , Rfl:      red yeast rice 600 MG CAPS, Take 600 mg by mouth daily, Disp: , Rfl:      exemestane (AROMASIN) 25 MG tablet, Take 25 mg by mouth daily, Disp: , Rfl:      fluticasone-salmeterol (ADVAIR DISKUS) 250-50 MCG/DOSE diskus inhaler, INHALE 1 PUFF INTO THE LUNGS 2 TIMES DAILY, Disp: 1 Inhaler, Rfl: 12     XOPENEX HFA 45 MCG/ACT inhaler, INHALE 1-2 PUFFS INTO THE LUNGS EVERY 6 HOURS AS NEEDED FOR SHORTNESS OF BREATH / DYSPNEA, Disp: 3 Inhaler, Rfl: 3     oseltamivir (TAMIFLU) 75 MG capsule, Take 1 capsule (75 mg) by mouth 2 times daily, Disp: 10 capsule, Rfl: 0     order for DME, Equipment being ordered: Portable travelling Nebulizer, Disp: 1 Device, Rfl: 0     TRIAMTERENE PO, , Disp: , Rfl:      tiotropium (SPIRIVA RESPIMAT) 2.5 MCG/ACT inhalation aerosol, Inhale 2 puffs into the lungs daily, Disp: 12 g, Rfl: 11     ORDER FOR DME, Equipment being ordered: Nebulizer - Portable for travel., Disp: 1 Device, Rfl: 0     levalbuterol (XOPENEX CONC) 1.25 MG/0.5ML NEBU, Take 0.5 mLs (1.25 mg) by nebulization every 6 hours as needed for wheezing, Disp: 60 mL, Rfl: 1     ipratropium (ATROVENT HFA) 17 MCG/ACT inhaler, Inhale 2 puffs into the lungs every 6 hours, Disp: 1 Inhaler, Rfl: 12     metoprolol (LOPRESSOR) 25 MG tablet, Take 25 mg by mouth 2 times daily., Disp: , Rfl:     Allergies   Allergen Reactions     Cafergot Hives     Codeine Anaphylaxis and Nausea     No Clinical Screening - See Comments Hives and Shortness Of Breath     Ofloxacin Anaphylaxis and Other (See Comments)     Asthma attack  Eye drops     Sulfa Drugs Hives, Unknown and Itching     No reaction documented in the chart  No reaction documented in the  "chart  big red ears and neck and face  No reaction documented in the chart  PN: LW Reaction: Itching, Pruritis     Timolol Other (See Comments)     Allergy reaction of hard time to breath.     Amlodipine      Leg edema     Lisinopril Cough     Meclizine Other (See Comments)     Asthma attack     Morphine Unknown     No reaction documented in the chart  No reaction documented in the chart  No reaction documented in the chart     Rosuvastatin Muscle Pain (Myalgia)     Valsartan Muscle Pain (Myalgia) and Nausea     Serevent [Salmeterol] Hives       Social History     Social History     Marital status: Single     Spouse name: N/A     Number of children: 0     Years of education: N/A     Social History Main Topics     Smoking status: Former Smoker     Packs/day: 2.00     Years: 16.00     Types: Cigarettes     Quit date: 7/22/1988     Smokeless tobacco: Never Used     Alcohol use None     Drug use: None     Sexual activity: Not Asked     Other Topics Concern     Blood Transfusions No     Exercise Yes     Social History Narrative       Family History   Problem Relation Age of Onset     Unknown/Adopted No family hx of        Physical examination:  Height 1.676 m (5' 6\"), weight 77.1 kg (169 lb 14.4 oz).    Well-developed, well-nourished and in no acute distress.  Alert and oriented to surroundings.  On examination of the left hand, there are no open wounds. There is no swelling or deformity. There is no tenderness of the index finger distal, middle, or proximal phalanxes or interphalangeal joints. Active DIP range of motion is from 0-40  area and FDP, FDS, and extensors intact. Finger is warm and well perfused. Sensation is intact throughout. Nail plate is intact and normal appearing, although nail is shorter than adjacent nail plates. No evidence of nail bed injury.    Radiographs: Three views of the left hand were obtained and reviewed. These demonstrate a healing fracture of the shaft of the index distal phalanx with " minimal displacement. There is no evidence of articular step-off and the fracture cannot be visualized extending into the joint.       Assessment: 67 year old female with apparent left index finger distal phalanx fracture healing well sustained 1 month ago with no signs of articular incongruity    Plan:    I see no indication for surgical intervention at this time. She is pretty much asymptomatic now. I recommend that patient continue her activities as tolerated. There are no restrictions. She knows to come back and see me again should she have any further questions or concerns regarding the finger.      Sincerely,    Gabriel Traore MD

## 2018-04-02 NOTE — PROGRESS NOTES
Date of Service: Apr 2, 2018    Chief Complaint:   Chief Complaint   Patient presents with     Hand Pain     left index finger, patient states she fell out of store into the street and her hand hit a pilon the finger nail peeled back.       History of Present Illness: Mercedes Hairston is a 67 year old, right handed female who presents today for further evaluation of a left finger injury. The patient was in [place] on March 3, 2018, when she fell on a cobblestone stair case out of a store into the street. She jammed her index finger against a Pilon and experienced immediate finger pain. The distalmost nail plate was pulled back but there were no open wounds. She was subsequently diagnosed with a fracture of the distal phalanx. She was told that this fracture extended into the joint and therefore was referred to a hand surgeon for further evaluation. She reports that she has no pain at this time. The finger is healing well. She has no difficulties with motion.    Review of Systems: A 14-point review of systems was obtained on intake reviewed. It is included at the bottom of this note.     Past Medical History:   Diagnosis Date     Breast cancer (H)      COPD (chronic obstructive pulmonary disease) (H)          Past Surgical History:   Procedure Laterality Date     BIOPSY       COLONOSCOPY       PHACOEMULSIFICATION CLEAR CORNEA W/ STANDARD IOL IMPLANT, ENDOSCOPIC CYCLOPHOTOCOAGULATION, COMBINED Left 12/18/2017    Procedure: COMBINED PHACOEMULSIFICATION CLEAR CORNEA WITH STANDARD INTRAOCULAR LENS IMPLANT, ENDOSCOPIC CYCLOPHOTOCOAGULATION;  COMBINED PHACOEMULSIFICATION CLEAR CORNEA WITH STANDARD INTRAOCULAR LENS IMPLANT, ENDOSCOPIC CYCLOPHOTOCOAGULATION ;  Surgeon: Connie Palacios MD;  Location: [facility]     PHACOEMULSIFICATION CLEAR CORNEA W/ STANDARD IOL IMPLANT, ENDOSCOPIC CYCLOPHOTOCOAGULATION, COMBINED Right 1/15/2018    Procedure: COMBINED PHACOEMULSIFICATION CLEAR CORNEA WITH STANDARD INTRAOCULAR LENS  IMPLANT, ENDOSCOPIC CYCLOPHOTOCOAGULATION;  RIGHT EYE PHACOEMULSIFICATION CLEAR CORNEA WITH STANDARD INTRAOCULAR LENS IMPLANT, ENDOSCOPIC CYCLOPHOTOCOAGULATION ;  Surgeon: Connie Palacios MD;  Location: [facility]         Current Outpatient Prescriptions:      carvedilol (COREG) 12.5 MG tablet, Take 12.5 mg by mouth, Disp: , Rfl:      co-enzyme Q-10 (SM COENZYME Q-10) 100 MG CAPS capsule, Take 100 mg by mouth, Disp: , Rfl:      fluticasone (FLONASE) 50 MCG/ACT spray, , Disp: , Rfl:      ipratropium (ATROVENT) 0.02 % neb solution, Inhale 0.5 mg into the lungs, Disp: , Rfl:      fluticasone-salmeterol (ADVAIR) 250-50 MCG/DOSE diskus inhaler, Inhale 1 puff into the lungs, Disp: , Rfl:      lovastatin (MEVACOR) 40 MG tablet, Take 80 mg by mouth, Disp: , Rfl:      ondansetron (ZOFRAN-ODT) 4 MG ODT tab, Take 4 mg by mouth, Disp: , Rfl:      triamterene-hydrochlorothiazide (MAXZIDE-25) 37.5-25 MG per tablet, , Disp: , Rfl:      fluticasone-salmeterol (ADVAIR DISKUS) 100-50 MCG/DOSE diskus inhaler, , Disp: , Rfl:      Cholecalciferol (VITAMIN D3) 1000 UNITS CAPS, , Disp: , Rfl:      exemestane (AROMASIN) 25 MG tablet, Take 25 mg by mouth, Disp: , Rfl:      fish oil-omega-3 fatty acids 1000 MG capsule, , Disp: , Rfl:      Red Yeast Rice Extract 600 MG CAPS, Take 1,200 mg by mouth, Disp: , Rfl:      Saw [place] 160 MG CAPS, , Disp: , Rfl:      Tiotropium Bromide Monohydrate 1.25 MCG/ACT AERS, , Disp: , Rfl:      Flavoring Agent (GRAPEFRUIT FLAVOR) OIL, Take 250 mg by mouth, Disp: , Rfl:      Ipratropium Bromide HFA (ATROVENT HFA IN), Inhale 2 puffs into the lungs, Disp: , Rfl:      cholecalciferol (VITAMIN D3) 1000 UNIT tablet, Take 1,000 Units by mouth, Disp: , Rfl:      VITAMIN D, CHOLECALCIFEROL, PO, Take 1,000 Units by mouth daily, Disp: , Rfl:      ASPIRIN PO, Take 81 mg by mouth daily, Disp: , Rfl:      omega 3 1000 MG CAPS, , Disp: , Rfl:      saw palmetto 450 MG CAPS capsule, Take 450 mg by mouth 3 times daily,  Disp: , Rfl:      red yeast rice 600 MG CAPS, Take 600 mg by mouth daily, Disp: , Rfl:      exemestane (AROMASIN) 25 MG tablet, Take 25 mg by mouth daily, Disp: , Rfl:      fluticasone-salmeterol (ADVAIR DISKUS) 250-50 MCG/DOSE diskus inhaler, INHALE 1 PUFF INTO THE LUNGS 2 TIMES DAILY, Disp: 1 Inhaler, Rfl: 12     XOPENEX HFA 45 MCG/ACT inhaler, INHALE 1-2 PUFFS INTO THE LUNGS EVERY 6 HOURS AS NEEDED FOR SHORTNESS OF BREATH / DYSPNEA, Disp: 3 Inhaler, Rfl: 3     oseltamivir (TAMIFLU) 75 MG capsule, Take 1 capsule (75 mg) by mouth 2 times daily, Disp: 10 capsule, Rfl: 0     order for DME, Equipment being ordered: Portable travelling Nebulizer, Disp: 1 Device, Rfl: 0     TRIAMTERENE PO, , Disp: , Rfl:      tiotropium (SPIRIVA RESPIMAT) 2.5 MCG/ACT inhalation aerosol, Inhale 2 puffs into the lungs daily, Disp: 12 g, Rfl: 11     ORDER FOR DME, Equipment being ordered: Nebulizer - Portable for travel., Disp: 1 Device, Rfl: 0     levalbuterol (XOPENEX CONC) 1.25 MG/0.5ML NEBU, Take 0.5 mLs (1.25 mg) by nebulization every 6 hours as needed for wheezing, Disp: 60 mL, Rfl: 1     ipratropium (ATROVENT HFA) 17 MCG/ACT inhaler, Inhale 2 puffs into the lungs every 6 hours, Disp: 1 Inhaler, Rfl: 12     metoprolol (LOPRESSOR) 25 MG tablet, Take 25 mg by mouth 2 times daily., Disp: , Rfl:     Allergies   Allergen Reactions     Cafergot Hives     Codeine Anaphylaxis and Nausea     No Clinical Screening - See Comments Hives and Shortness Of Breath     Ofloxacin Anaphylaxis and Other (See Comments)     Asthma attack  Eye drops     Sulfa Drugs Hives, Unknown and Itching     No reaction documented in the chart  No reaction documented in the chart  big red ears and neck and face  No reaction documented in the chart  PN: LW Reaction: Itching, Pruritis     Timolol Other (See Comments)     Allergy reaction of hard time to breath.     Amlodipine      Leg edema     Lisinopril Cough     Meclizine Other (See Comments)     Asthma attack      "Morphine Unknown     No reaction documented in the chart  No reaction documented in the chart  No reaction documented in the chart     Rosuvastatin Muscle Pain (Myalgia)     Valsartan Muscle Pain (Myalgia) and Nausea     Serevent [Salmeterol] Hives       Social History     Social History     Marital status: Single     Spouse name: N/A     Number of children: 0     Years of education: N/A     Social History Main Topics     Smoking status: Former Smoker     Packs/day: 2.00     Years: 16.00     Types: Cigarettes     Quit date: 7/22/1988     Smokeless tobacco: Never Used     Alcohol use None     Drug use: None     Sexual activity: Not Asked     Other Topics Concern     Blood Transfusions No     Exercise Yes     Social History Narrative       Family History   Problem Relation Age of Onset     Unknown/Adopted No family hx of        Physical examination:  Height 1.676 m (5' 6\"), weight 77.1 kg (169 lb 14.4 oz).    Well-developed, well-nourished and in no acute distress.  Alert and oriented to surroundings.  On examination of the left hand, there are no open wounds. There is no swelling or deformity. There is no tenderness of the index finger distal, middle, or proximal phalanxes or interphalangeal joints. Active DIP range of motion is from 0-40  area and FDP, FDS, and extensors intact. Finger is warm and well perfused. Sensation is intact throughout. Nail plate is intact and normal appearing, although nail is shorter than adjacent nail plates. No evidence of nail bed injury.    Radiographs: Three views of the left hand were obtained and reviewed. These demonstrate a healing fracture of the shaft of the index distal phalanx with minimal displacement. There is no evidence of articular step-off and the fracture cannot be visualized extending into the joint.       Assessment: 67 year old female with apparent left index finger distal phalanx fracture healing well sustained 1 month ago with no signs of articular " incongruity    Plan:    I see no indication for surgical intervention at this time. She is pretty much asymptomatic now. I recommend that patient continue her activities as tolerated. There are no restrictions. She knows to come back and see me again should she have any further questions or concerns regarding the finger.

## 2018-04-02 NOTE — MR AVS SNAPSHOT
"              After Visit Summary   4/2/2018    Mercedes Hairston    MRN: [id number]           Patient Information     Date Of Birth          1950        Visit Information        Provider Department      4/2/2018 12:45 PM Gabriel Traore MD [facility]        Today's Diagnoses     Closed nondisplaced fracture of distal phalanx of left index finger with routine healing    -  1       Follow-ups after your visit        Follow-up notes from your care team     Return if symptoms worsen or fail to improve.      Who to contact     Please call your clinic at [phone] to:    Ask questions about your health    Make or cancel appointments    Discuss your medicines    Learn about your test results    Speak to your doctor            Additional Information About Your Visit        [employer] Information     [employer] gives you secure access to your electronic health record. If you see a primary care provider, you can also send messages to your care team and make appointments. If you have questions, please call your primary care clinic.  If you do not have a primary care provider, please call [phone] and they will assist you.      [employer] is an electronic gateway that provides easy, online access to your medical records. With [employer], you can request a clinic appointment, read your test results, renew a prescription or communicate with your care team.     To access your existing account, please contact your [facility] or call [phone] for assistance.        Care EveryWhere ID     This is your Care EveryWhere ID. This could be used by other organizations to access your [place] medical records  [id number]        Your Vitals Were     Height BMI (Body Mass Index)                1.676 m (5' 6\") 27.42 kg/m2           Blood Pressure from Last 3 Encounters:   01/15/18 134/70   12/18/17 127/61   01/26/16 165/75    Weight from Last 3 Encounters:   04/02/18 77.1 kg (169 lb " 14.4 oz)   12/15/17 76.1 kg (167 lb 12.3 oz)   01/26/16 78.5 kg (173 lb)              Today, you had the following     No orders found for display       Primary Care Provider Office Phone # Fax #    Jim Patricia [phone]       [facility] [address]  [facility] [address]        Equal Access to Services     NIKITA HAMLIN : Hadii aad ku hadasho Soomaali, waaxda luqadaha, qaybta kaalmada adeegyada, waxay idiin hayaan adeeg renosh la'aan ah. So [facility] [phone].    ATENCIÓN: Si habla español, tiene a willett disposición servicios gratuitos de asistencia lingüística. Lydia al [phone].    We comply with applicable federal civil rights laws and [place] laws. We do not discriminate on the basis of race, color, national origin, age, disability, sex, sexual orientation, or gender identity.            Thank you!     Thank you for choosing [facility]  for your care. Our goal is always to provide you with excellent care. Hearing back from our patients is one way we can continue to improve our services. Please take a few minutes to complete the written survey that you may receive in the mail after your visit with us. Thank you!             Your Updated Medication List - Protect others around you: Learn how to safely use, store and throw away your medicines at www.disposemymeds.org.          This list is accurate as of 4/2/18 11:59 PM.  Always use your most recent med list.                   Brand Name Dispense Instructions for use Diagnosis    ASPIRIN PO      Take 81 mg by mouth daily        carvedilol 12.5 MG tablet    COREG     Take 12.5 mg by mouth        * exemestane 25 MG tablet    AROMASIN     Take 25 mg by mouth daily        * exemestane 25 MG tablet    AROMASIN     Take 25 mg by mouth        fluticasone 50 MCG/ACT spray    FLONASE          * fluticasone-salmeterol 250-50 MCG/DOSE diskus inhaler    ADVAIR     Inhale 1 puff into the lungs        * fluticasone-salmeterol  250-50 MCG/DOSE diskus inhaler    ADVAIR DISKUS    1 Inhaler    INHALE 1 PUFF INTO THE LUNGS 2 TIMES DAILY    COPD (chronic obstructive pulmonary disease) (H)       * ADVAIR DISKUS 100-50 MCG/DOSE diskus inhaler   Generic drug:  fluticasone-salmeterol           Grapefruit Flavor Oil      Take 250 mg by mouth        ipratropium 0.02 % neb solution    ATROVENT     Inhale 0.5 mg into the lungs        * ATROVENT HFA IN      Inhale 2 puffs into the lungs        * ipratropium 17 MCG/ACT Inhaler    ATROVENT HFA    1 Inhaler    Inhale 2 puffs into the lungs every 6 hours    COPD (chronic obstructive pulmonary disease) (H)       levalbuterol 1.25 MG/0.5ML Nebu neb solution    XOPENEX CONC    60 mL    Take 0.5 mLs (1.25 mg) by nebulization every 6 hours as needed for wheezing    COPD exacerbation (H)       lovastatin 40 MG tablet    MEVACOR     Take 80 mg by mouth        metoprolol tartrate 25 MG tablet    LOPRESSOR     Take 25 mg by mouth 2 times daily.        * omega 3 1000 MG Caps           * fish oil-omega-3 fatty acids 1000 MG capsule           ondansetron 4 MG ODT tab    ZOFRAN-ODT     Take 4 mg by mouth        order for DME     1 Device    Equipment being ordered: Nebulizer - Portable for travel.    COPD (chronic obstructive pulmonary disease) (H)       order for DME     1 Device    Equipment being ordered: Portable travelling Nebulizer    Chronic obstructive pulmonary disease, unspecified COPD type (H)       oseltamivir 75 MG capsule    TAMIFLU    10 capsule    Take 1 capsule (75 mg) by mouth 2 times daily    COPD exacerbation (H)       * red yeast rice 600 MG Caps      Take 600 mg by mouth daily        * Red Yeast Rice Extract 600 MG Caps      Take 1,200 mg by mouth        * saw palmetto 450 MG Caps capsule      Take 450 mg by mouth 3 times daily        * Saw [place]metto 160 MG Caps           SM COENZYME Q-10 100 MG Caps capsule   Generic drug:  co-enzyme Q-10      Take 100 mg by mouth        * tiotropium 2.5 MCG/ACT  inhalation aerosol    SPIRIVA RESPIMAT    12 g    Inhale 2 puffs into the lungs daily    COPD (chronic obstructive pulmonary disease) (H)       * Tiotropium Bromide Monohydrate 1.25 MCG/ACT Aers           TRIAMTERENE PO           triamterene-hydrochlorothiazide 37.5-25 MG per tablet    MAXZIDE-25          * VITAMIN D (CHOLECALCIFEROL) PO      Take 1,000 Units by mouth daily        * cholecalciferol 1000 UNIT tablet    vitamin D3     Take 1,000 Units by mouth        * vitamin D3 1000 UNITS Caps           XOPENEX HFA 45 MCG/ACT Inhaler   Generic drug:  levalbuterol     3 Inhaler    INHALE 1-2 PUFFS INTO THE LUNGS EVERY 6 HOURS AS NEEDED FOR SHORTNESS OF BREATH / DYSPNEA    Chronic obstructive pulmonary disease with acute exacerbation (H)       * Notice:  This list has 18 medication(s) that are the same as other medications prescribed for you. Read the directions carefully, and ask your doctor or other care provider to review them with you.

## 2018-04-02 NOTE — LETTER
4/2/2018      RE: Mercedes Hairston  [address] [place] [id number]       Date of Service: Apr 2, 2018    Chief Complaint:   Chief Complaint   Patient presents with     Hand Pain     left index finger, patient states she fell out of store into the street and her hand hit a pilon the finger nail peeled back.       History of Present Illness: Mercedes Hairston is a 67 year old, right handed female who presents today for further evaluation of a left finger injury. The patient was in [place] on March 3, 2018, when she fell on a cobblestone stair case out of a store into the street. She jammed her index finger against a Pilon and experienced immediate finger pain. The distalmost nail plate was pulled back but there were no open wounds. She was subsequently diagnosed with a fracture of the distal phalanx. She was told that this fracture extended into the joint and therefore was referred to a hand surgeon for further evaluation. She reports that she has no pain at this time. The finger is healing well. She has no difficulties with motion.    Review of Systems: A 14-point review of systems was obtained on intake reviewed. It is included at the bottom of this note.     Past Medical History:   Diagnosis Date     Breast cancer (H)      COPD (chronic obstructive pulmonary disease) (H)          Past Surgical History:   Procedure Laterality Date     BIOPSY       COLONOSCOPY       PHACOEMULSIFICATION CLEAR CORNEA W/ STANDARD IOL IMPLANT, ENDOSCOPIC CYCLOPHOTOCOAGULATION, COMBINED Left 12/18/2017    Procedure: COMBINED PHACOEMULSIFICATION CLEAR CORNEA WITH STANDARD INTRAOCULAR LENS IMPLANT, ENDOSCOPIC CYCLOPHOTOCOAGULATION;  COMBINED PHACOEMULSIFICATION CLEAR CORNEA WITH STANDARD INTRAOCULAR LENS IMPLANT, ENDOSCOPIC CYCLOPHOTOCOAGULATION ;  Surgeon: Connie Palacios MD;  Location: [facility]     PHACOEMULSIFICATION CLEAR CORNEA W/ STANDARD IOL IMPLANT, ENDOSCOPIC CYCLOPHOTOCOAGULATION, COMBINED Right  1/15/2018    Procedure: COMBINED PHACOEMULSIFICATION CLEAR CORNEA WITH STANDARD INTRAOCULAR LENS IMPLANT, ENDOSCOPIC CYCLOPHOTOCOAGULATION;  RIGHT EYE PHACOEMULSIFICATION CLEAR CORNEA WITH STANDARD INTRAOCULAR LENS IMPLANT, ENDOSCOPIC CYCLOPHOTOCOAGULATION ;  Surgeon: Connie Palacios MD;  Location: [facility]         Current Outpatient Prescriptions:      carvedilol (COREG) 12.5 MG tablet, Take 12.5 mg by mouth, Disp: , Rfl:      co-enzyme Q-10 (SM COENZYME Q-10) 100 MG CAPS capsule, Take 100 mg by mouth, Disp: , Rfl:      fluticasone (FLONASE) 50 MCG/ACT spray, , Disp: , Rfl:      ipratropium (ATROVENT) 0.02 % neb solution, Inhale 0.5 mg into the lungs, Disp: , Rfl:      fluticasone-salmeterol (ADVAIR) 250-50 MCG/DOSE diskus inhaler, Inhale 1 puff into the lungs, Disp: , Rfl:      lovastatin (MEVACOR) 40 MG tablet, Take 80 mg by mouth, Disp: , Rfl:      ondansetron (ZOFRAN-ODT) 4 MG ODT tab, Take 4 mg by mouth, Disp: , Rfl:      triamterene-hydrochlorothiazide (MAXZIDE-25) 37.5-25 MG per tablet, , Disp: , Rfl:      fluticasone-salmeterol (ADVAIR DISKUS) 100-50 MCG/DOSE diskus inhaler, , Disp: , Rfl:      Cholecalciferol (VITAMIN D3) 1000 UNITS CAPS, , Disp: , Rfl:      exemestane (AROMASIN) 25 MG tablet, Take 25 mg by mouth, Disp: , Rfl:      fish oil-omega-3 fatty acids 1000 MG capsule, , Disp: , Rfl:      Red Yeast Rice Extract 600 MG CAPS, Take 1,200 mg by mouth, Disp: , Rfl:      Saw [place] 160 MG CAPS, , Disp: , Rfl:      Tiotropium Bromide Monohydrate 1.25 MCG/ACT AERS, , Disp: , Rfl:      Flavoring Agent (GRAPEFRUIT FLAVOR) OIL, Take 250 mg by mouth, Disp: , Rfl:      Ipratropium Bromide HFA (ATROVENT HFA IN), Inhale 2 puffs into the lungs, Disp: , Rfl:      cholecalciferol (VITAMIN D3) 1000 UNIT tablet, Take 1,000 Units by mouth, Disp: , Rfl:      VITAMIN D, CHOLECALCIFEROL, PO, Take 1,000 Units by mouth daily, Disp: , Rfl:      ASPIRIN PO, Take 81 mg by mouth daily, Disp: , Rfl:      omega 3 1000 MG  CAPS, , Disp: , Rfl:      saw pal[place] 450 MG CAPS capsule, Take 450 mg by mouth 3 times daily, Disp: , Rfl:      red yeast rice 600 MG CAPS, Take 600 mg by mouth daily, Disp: , Rfl:      exemestane (AROMASIN) 25 MG tablet, Take 25 mg by mouth daily, Disp: , Rfl:      fluticasone-salmeterol (ADVAIR DISKUS) 250-50 MCG/DOSE diskus inhaler, INHALE 1 PUFF INTO THE LUNGS 2 TIMES DAILY, Disp: 1 Inhaler, Rfl: 12     XOPENEX HFA 45 MCG/ACT inhaler, INHALE 1-2 PUFFS INTO THE LUNGS EVERY 6 HOURS AS NEEDED FOR SHORTNESS OF BREATH / DYSPNEA, Disp: 3 Inhaler, Rfl: 3     oseltamivir (TAMIFLU) 75 MG capsule, Take 1 capsule (75 mg) by mouth 2 times daily, Disp: 10 capsule, Rfl: 0     order for DME, Equipment being ordered: Portable travelling Nebulizer, Disp: 1 Device, Rfl: 0     TRIAMTERENE PO, , Disp: , Rfl:      tiotropium (SPIRIVA RESPIMAT) 2.5 MCG/ACT inhalation aerosol, Inhale 2 puffs into the lungs daily, Disp: 12 g, Rfl: 11     ORDER FOR DME, Equipment being ordered: Nebulizer - Portable for travel., Disp: 1 Device, Rfl: 0     levalbuterol (XOPENEX CONC) 1.25 MG/0.5ML NEBU, Take 0.5 mLs (1.25 mg) by nebulization every 6 hours as needed for wheezing, Disp: 60 mL, Rfl: 1     ipratropium (ATROVENT HFA) 17 MCG/ACT inhaler, Inhale 2 puffs into the lungs every 6 hours, Disp: 1 Inhaler, Rfl: 12     metoprolol (LOPRESSOR) 25 MG tablet, Take 25 mg by mouth 2 times daily., Disp: , Rfl:     Allergies   Allergen Reactions     Cafergot Hives     Codeine Anaphylaxis and Nausea     No Clinical Screening - See Comments Hives and Shortness Of Breath     Ofloxacin Anaphylaxis and Other (See Comments)     Asthma attack  Eye drops     Sulfa Drugs Hives, Unknown and Itching     No reaction documented in the chart  No reaction documented in the chart  big red ears and neck and face  No reaction documented in the chart  PN: LW Reaction: Itching, Pruritis     Timolol Other (See Comments)     Allergy reaction of hard time to breath.     Amlodipine   "    Leg edema     Lisinopril Cough     Meclizine Other (See Comments)     Asthma attack     Morphine Unknown     No reaction documented in the chart  No reaction documented in the chart  No reaction documented in the chart     Rosuvastatin Muscle Pain (Myalgia)     Valsartan Muscle Pain (Myalgia) and Nausea     Serevent [Salmeterol] Hives       Social History     Social History     Marital status: Single     Spouse name: N/A     Number of children: 0     Years of education: N/A     Social History Main Topics     Smoking status: Former Smoker     Packs/day: 2.00     Years: 16.00     Types: Cigarettes     Quit date: 7/22/1988     Smokeless tobacco: Never Used     Alcohol use None     Drug use: None     Sexual activity: Not Asked     Other Topics Concern     Blood Transfusions No     Exercise Yes     Social History Narrative       Family History   Problem Relation Age of Onset     Unknown/Adopted No family hx of        Physical examination:  Height 1.676 m (5' 6\"), weight 77.1 kg (169 lb 14.4 oz).    Well-developed, well-nourished and in no acute distress.  Alert and oriented to surroundings.  On examination of the left hand, there are no open wounds. There is no swelling or deformity. There is no tenderness of the index finger distal, middle, or proximal phalanxes or interphalangeal joints. Active DIP range of motion is from 0-40  area and FDP, FDS, and extensors intact. Finger is warm and well perfused. Sensation is intact throughout. Nail plate is intact and normal appearing, although nail is shorter than adjacent nail plates. No evidence of nail bed injury.    Radiographs: Three views of the left hand were obtained and reviewed. These demonstrate a healing fracture of the shaft of the index distal phalanx with minimal displacement. There is no evidence of articular step-off and the fracture cannot be visualized extending into the joint.       Assessment: 67 year old female with apparent left index finger distal " phalanx fracture healing well sustained 1 month ago with no signs of articular incongruity    Plan:    I see no indication for surgical intervention at this time. She is pretty much asymptomatic now. I recommend that patient continue her activities as tolerated. There are no restrictions. She knows to come back and see me again should she have any further questions or concerns regarding the finger.    Gabriel Traore MD

## 2018-04-02 NOTE — NURSING NOTE
"Reason For Visit:   Chief Complaint   Patient presents with     Hand Pain     left index finger, patient states she fell out of store into the street and her hand hit a pilon the finger nail peeled back.       Primary MD: Jim Patricia  Ref. MD: Safford Austin [place]    ?  No    Age: 67 year old    Occupation:Self employed.  Currently working? Yes.  Work status?  Full time.  Date of injury: 03/03/2018  Type of injury: non displaced fx of left index finger.  Smoker: No  Request smoking cessation information: No      Ht 1.676 m (5' 6\")  Wt 77.1 kg (169 lb 14.4 oz)  BMI 27.42 kg/m2      Pain Assessment  Patient Currently in Pain: Yes  0-10 Pain Scale: 2    Hand Dominance Evaluation  Hand Dominance: Right          QuickDASH Assessment  No flowsheet data found.       Allergies   Allergen Reactions     Cafergot Hives     Codeine Anaphylaxis and Nausea     No Clinical Screening - See Comments Hives and Shortness Of Breath     Ofloxacin Anaphylaxis and Other (See Comments)     Asthma attack  Eye drops     Sulfa Drugs Hives, Unknown and Itching     No reaction documented in the chart  No reaction documented in the chart  big red ears and neck and face  No reaction documented in the chart  PN: LW Reaction: Itching, Pruritis     Timolol Other (See Comments)     Allergy reaction of hard time to breath.     Amlodipine      Leg edema     Lisinopril Cough     Meclizine Other (See Comments)     Asthma attack     Morphine Unknown     No reaction documented in the chart  No reaction documented in the chart  No reaction documented in the chart     Rosuvastatin Muscle Pain (Myalgia)     Valsartan Muscle Pain (Myalgia) and Nausea     Serevent [Salmeterol] Hivdamien Rivera CMA    "

## 2018-12-10 ENCOUNTER — TRANSFERRED RECORDS (OUTPATIENT)
Dept: HEALTH INFORMATION MANAGEMENT | Facility: CLINIC | Age: 68
End: 2018-12-10

## 2019-01-10 ENCOUNTER — HOSPITAL ENCOUNTER (OUTPATIENT)
Dept: PHYSICAL THERAPY | Facility: CLINIC | Age: 69
Setting detail: THERAPIES SERIES
End: 2019-01-10
Attending: FAMILY MEDICINE
Payer: MEDICARE

## 2019-01-10 PROCEDURE — 97161 PT EVAL LOW COMPLEX 20 MIN: CPT | Mod: GP | Performed by: PHYSICAL THERAPIST

## 2019-01-10 PROCEDURE — 97112 NEUROMUSCULAR REEDUCATION: CPT | Mod: GP | Performed by: PHYSICAL THERAPIST

## 2019-01-10 NOTE — PROGRESS NOTES
[facility]        OUTPATIENT PHYSICAL THERAPY FUNCTIONAL EVALUATION  PLAN OF TREATMENT FOR OUTPATIENT REHABILITATION  (COMPLETE FOR INITIAL CLAIMS ONLY)  Patient's Last Name, First Name, M.I.  YOB: 1950  Mercedes Hairston     Provider's Name   [facility]   Medical Record No.  [id number]     Start of Care Date:  01/10/19   Onset Date:  12/10/19   Type:     _X__PT   ____OT  ____SLP Medical Diagnosis:  Dizziness     PT Diagnosis:  Mild instability and motion sensitivity Visits from SOC:  1                              __________________________________________________________________________________  Plan of Treatment/Functional Goals:  neuromuscular re-education           GOALS  Balance exercises  The pt will demonstrate safety and understanding of balance exercises in order to reduce future risk of falls  01/10/19 (MET)         Therapy Frequency:  1 time/week   Predicted Duration of Therapy Intervention:  1 visit    Celestina De Jesus, PT                                    I CERTIFY THE NEED FOR THESE SERVICES FURNISHED UNDER        THIS PLAN OF TREATMENT AND WHILE UNDER MY CARE .             Physician Signature               Date    X_____________________________________________________                      Certification Date From:  01/10/19   Certification Date To:  01/10/19    Referring Provider:  Jim Patricia    Initial Assessment  See Epic Evaluation- Start of Care Date: 01/10/19

## 2019-01-10 NOTE — PROGRESS NOTES
01/10/19 1400   Quick Adds   Quick Adds Certification;Vestibular Eval   Type of Visit Initial OP PT Evaluation   General Information   Start of Care Date 01/10/19   Referring Physician Jim Patricia   Orders Evaluate and Treat as Indicated   Order Date 12/10/19   Medical Diagnosis dizziness   Onset of illness/injury or Date of Surgery 12/10/19   Surgical/Medical history reviewed Yes   Pertinent history of current problem (include personal factors and/or comorbidities that impact the POC) The pt presents with dizziness related to head movement. Notices dizziness with walking and bending down.Dizziness began about 1 year ago after having an infusion. Initially thought it was a side effect. Gradually episodes got shorter and then went away over the summer. Began again when she was having PT for her knee following a stem cell injection. Reports lying on mat table and would feel dizzy when standing up. Dizziness has been better recently, has been focusing on staying more hydrated. Dizziness was associated with nausea. Reports that there is no spinning, more dysequalibrium and pressure.    Prior level of function comment Was previously limited by knee pain however this has improved since injection. The pt enjoys [place] dancing   Patient role/Employment history Employed  (dance instructor)   Living environment Apartment/condo   Patient/Family Goals Statement decrease dizzines   Fall Risk Screen   Fall screen completed by PT   Have you fallen 2 or more times in the past year? Yes   Have you fallen and had an injury in the past year? No   Is patient a fall risk? No   Fall screen comments per FGA   Pain   Patient currently in pain No   Pain comments see's a chiropractor for history of neck and knee pain   Cognitive Status Examination   Orientation orientation to person, place and time   Integumentary   Integumentary No deficits were identified   Posture   Posture Forward head position   Range of Motion (ROM)   ROM  Comment B LEs WFLs   Strength   Strength Comments Not formally assessed but demonstrates functional strengrh   Bed Mobility   Bed Mobility Comments Independent   Transfer Skills   Transfer Comments Independent   Gait   Gait Comments No evidence of instability, good speed. Path deviations noted with head turns   Gait Special Tests   Gait Special Tests FUNCTIONAL GAIT ASSESSMENT   Gait Special Tests Functional Gait Assessment Score out of 30   Score out of 30 24   Balance Special Tests   Balance Special Tests Romberg   Balance Special Tests Romberg   Seconds 30 Seconds   Comments increased postural sway   Sensory Examination   Sensory Perception no deficits were identified   Cervicogenic Screen   Neck ROM WFLs, mild stiffness into rotation   Oculomotor Exam   Smooth Pursuit Normal   Saccades Normal   VOR Normal   VOR Cancellation Comments increases nausea   Rapid Head Thrust Normal   Infrared Goggle Exam or Frenzel Lense Exam   Vestibular Suppressant in Last 24 Hours? No   Exam completed with Infrared Goggles   Spontaneous Nystagmus Negative   Gaze Evoked Nystagmus Negative   Head Shake Horizontal Nystagmus Negative   Positional Testing comments 4-5 mild upbeats when first brought back, not symptomatic   Jigar-Hallpike (right) Negative   Jigar-Hallpike (Left) Upbeating   [place]-Hallpike (left) comments mild, 4-5 beats, not symptomatic   HSCC Supine Roll Test (Right) Negative   HSCC Supine Roll Test (Left) Negative   Dynamic Visual Acuity (DVA)   Static Acuity (LogMar) .0   Horizontal Head Movement at 1 Hz (LogMar) .0   Horizontal Head Movement at 2 Hz (LogMar) .0   Planned Therapy Interventions   Planned Therapy Interventions neuromuscular re-education   Clinical Impression   Criteria for Skilled Therapeutic Interventions Met yes, treatment indicated   PT Diagnosis Mild instability and motion sensitivity   Influenced by the following impairments instability, motion sensitivit   Functional limitations due to impairments mild  difficuilty with higher level balance tasks, mild dizziness   Clinical Presentation Stable/Uncomplicated   Clinical Decision Making (Complexity) Low complexity   Therapy Frequency 1 time/week   Predicted Duration of Therapy Intervention (days/wks) 1 visit   Risk & Benefits of therapy have been explained Yes   Patient, Family & other staff in agreement with plan of care Yes   Education Assessment   Preferred Learning Style Listening;Demonstration   Barriers to Learning No barriers   Total Evaluation Time   PT Eval, Low Complexity Minutes (30516) 35   Therapy Certification   Certification date from 01/10/19   Certification date to 01/10/19   Medical Diagnosis Dizziness   Certification I certify the need for these services furnished under this plan of treatment and while under my care.  (Physician co-signature of this document indicates review and certification of the therapy plan).

## 2019-01-10 NOTE — PROGRESS NOTES
01/10/19 1400   Quick Adds   Quick Adds Certification;Vestibular Eval   Type of Visit Initial OP PT Evaluation   General Information   Start of Care Date 01/10/19   Referring Physician Jim Patricia   Orders Evaluate and Treat as Indicated   Order Date 12/10/19   Medical Diagnosis dizziness   Onset of illness/injury or Date of Surgery 12/10/19   Surgical/Medical history reviewed Yes   Pertinent history of current problem (include personal factors and/or comorbidities that impact the POC) The pt presents with dizziness related to head movement. Notices dizziness with walking and bending down.Dizziness began about 1 year ago after having an infusion. Initially thought it was a side effect. Gradually episodes got shorter and then went away over the summer. Began again when she was having PT for her knee following a stem cell injection. Reports lying on mat table and would feel dizzy when standing up. Dizziness has been better recently, has been focusing on staying more hydrated. Dizziness was associated with nausea. Reports that there is no spinning, more dysequalibrium and pressure.    Prior level of function comment Was previously limited by knee pain however this has improved since injection. The pt enjoys [place] dancing   Patient role/Employment history Employed  (dance instructor)   Living environment Apartment/condo   Patient/Family Goals Statement decrease dizzines   Fall Risk Screen   Fall screen completed by PT   Have you fallen 2 or more times in the past year? Yes   Have you fallen and had an injury in the past year? No   Is patient a fall risk? No   Fall screen comments per FGA   Pain   Patient currently in pain No   Pain comments see's a chiropractor for history of neck and knee pain   Cognitive Status Examination   Orientation orientation to person, place and time   Integumentary   Integumentary No deficits were identified   Posture   Posture Forward head position   Range of Motion (ROM)   ROM  Comment B LEs WFLs   Strength   Strength Comments Not formally assessed but demonstrates functional strengrh   Bed Mobility   Bed Mobility Comments Independent   Transfer Skills   Transfer Comments Independent   Gait   Gait Comments No evidence of instability, good speed. Path deviations noted with head turns   Gait Special Tests   Gait Special Tests FUNCTIONAL GAIT ASSESSMENT   Gait Special Tests Functional Gait Assessment Score out of 30   Score out of 30 24   Balance Special Tests   Balance Special Tests Romberg   Balance Special Tests Romberg   Seconds 30 Seconds   Comments increased postural sway   Sensory Examination   Sensory Perception no deficits were identified   Cervicogenic Screen   Neck ROM WFLs, mild stiffness into rotation   Oculomotor Exam   Smooth Pursuit Normal   Saccades Normal   VOR Normal   VOR Cancellation Comments increases nausea   Rapid Head Thrust Normal   Infrared Goggle Exam or Frenzel Lense Exam   Vestibular Suppressant in Last 24 Hours? No   Exam completed with Infrared Goggles   Spontaneous Nystagmus Negative   Gaze Evoked Nystagmus Negative   Head Shake Horizontal Nystagmus Negative   Positional Testing comments 4-5 mild upbeats when first brought back, not symptomatic   Jigar-Hallpike (right) Negative   Jigar-Hallpike (Left) Upbeating   [place]-Hallpike (left) comments mild, 4-5 beats, not symptomatic   HSCC Supine Roll Test (Right) Negative   HSCC Supine Roll Test (Left) Negative   Dynamic Visual Acuity (DVA)   Static Acuity (LogMar) .0   Horizontal Head Movement at 1 Hz (LogMar) .0   Horizontal Head Movement at 2 Hz (LogMar) .0   Planned Therapy Interventions   Planned Therapy Interventions neuromuscular re-education   Clinical Impression   Criteria for Skilled Therapeutic Interventions Met yes, treatment indicated   PT Diagnosis Mild instability and motion sensitivity   Influenced by the following impairments instability, motion sensitivit   Functional limitations due to impairments mild  difficuilty with higher level balance tasks, mild dizziness   Clinical Presentation Stable/Uncomplicated   Clinical Decision Making (Complexity) Low complexity   Therapy Frequency 1 time/week   Predicted Duration of Therapy Intervention (days/wks) 1 visit   Risk & Benefits of therapy have been explained Yes   Patient, Family & other staff in agreement with plan of care Yes   Clinical Impression Comments The pt tested negative for inner ear involvement today. Did have mild balance impairments. Will initiate balance program.   Education Assessment   Preferred Learning Style Listening;Demonstration   Barriers to Learning No barriers   GOALS   PT Eval Goals 1   Goal 1   Goal Identifier Balance exercises   Goal Description The pt will demonstrate safety and understanding of balance exercises in order to reduce future risk of falls   Target Date 01/10/19   Date Met 01/10/19   Total Evaluation Time   PT Eval, Low Complexity Minutes (55010) 35   Therapy Certification   Certification date from 01/10/19   Certification date to 01/10/19   Medical Diagnosis Dizziness   Certification I certify the need for these services furnished under this plan of treatment and while under my care.  (Physician co-signature of this document indicates review and certification of the therapy plan).

## 2019-07-12 ENCOUNTER — DOCUMENTATION ONLY (OUTPATIENT)
Dept: CARE COORDINATION | Facility: CLINIC | Age: 69
End: 2019-07-12

## 2019-09-29 ENCOUNTER — HEALTH MAINTENANCE LETTER (OUTPATIENT)
Age: 69
End: 2019-09-29

## 2020-03-15 ENCOUNTER — HEALTH MAINTENANCE LETTER (OUTPATIENT)
Age: 70
End: 2020-03-15

## 2021-01-14 ENCOUNTER — HEALTH MAINTENANCE LETTER (OUTPATIENT)
Age: 71
End: 2021-01-14

## 2021-05-09 ENCOUNTER — HEALTH MAINTENANCE LETTER (OUTPATIENT)
Age: 71
End: 2021-05-09

## 2021-06-08 NOTE — PROGRESS NOTES
"1/10/17    Start time: 1230    Stop Time: 1330   Session # 9    Mercedes Hairston is a 66 y.o. female is being seen today for issues related to her current relationship..     New symptoms or complaints: Yue stated that she is having difficulty sleeping and has increased worry due to the uncertainty of her living situation.    Functional Impairment:   Personal: 4  Family: 4  Work: 2  Social: 3    Clinical assessment of mental status: Mercedes Hairston presented on time.  She was open and cooperative, and dressed appropriately for this session and weather. Her memory was intact. Her speech was somewhat pressured.  Language was clear. Mood appeared anxious. Concentration and focus is adequate Psychosis is not noted or reported. Affect is congruent with speech. Fund of knowledge is adequate. Insight is adequate for therapy.     Suicidal/Homicidal Ideation present: None Reported This Session    Patient's impression of their current status: Patient states that she has \"up and down days.\"  She has concerns as to whether or not her relationship will last and is also concerned about where she will be living if their relationship ends.  She states that she is thankful for her sister who is supportive and willing to be there for her.    Therapist impression of patients current state: Patient appears worried about her future living arrangements as well as her relationship.  She is also concerned about what other people think about her and mentions wanting to let them know that \"they're 2 sides to every story.\"  Patient was encouraged not to follow through with her desire to do this rather concentrate on her own immediate issues.  Type of psychotherapeutic technique provided: Insight oriented and Solution-focused    Progress toward short term goals :Poor progress, Due to uncertainty regarding living arrangements.    Review of long term goals: Not done at today's visit    Diagnosis: Adjustment disorder with anxiety and " depressed mood    Plan and Follow up: Follow-up in approximately 2 weeks or as needed.      Discharge Criteria/Planning: Patient will continue with follow-up until therapy can be discontinued without return of signs and symptoms.     Thuy BRASHER  1/11/17  1300

## 2021-06-11 NOTE — PROGRESS NOTES
Psychotherapy Discharge Note:        7/17/17  Pt. Has not been seen for Psychotherapy since Jan, 2017. Pt. Had decided to seek couple counseling with a therapist who offers that service.      Plan:    No further psychological services are recommended at this time.  The patient was advised on the procedures for obtaining psychological services should the need arise at any point in the future.    Thuy Marley [facility]  7/17/17  1400

## 2021-10-24 ENCOUNTER — HEALTH MAINTENANCE LETTER (OUTPATIENT)
Age: 71
End: 2021-10-24

## 2022-06-05 ENCOUNTER — HEALTH MAINTENANCE LETTER (OUTPATIENT)
Age: 72
End: 2022-06-05

## 2022-10-15 ENCOUNTER — HEALTH MAINTENANCE LETTER (OUTPATIENT)
Age: 72
End: 2022-10-15

## 2023-03-26 ENCOUNTER — HEALTH MAINTENANCE LETTER (OUTPATIENT)
Age: 73
End: 2023-03-26

## 2023-06-11 ENCOUNTER — HEALTH MAINTENANCE LETTER (OUTPATIENT)
Age: 73
End: 2023-06-11

## 2024-02-21 ENCOUNTER — MEDICAL CORRESPONDENCE (OUTPATIENT)
Dept: HEALTH INFORMATION MANAGEMENT | Facility: CLINIC | Age: 74
End: 2024-02-21
Payer: COMMERCIAL

## 2024-02-22 ENCOUNTER — TRANSCRIBE ORDERS (OUTPATIENT)
Dept: OTHER | Age: 74
End: 2024-02-22

## 2024-02-22 DIAGNOSIS — H91.93 DECREASED HEARING OF BOTH EARS: ICD-10-CM

## 2024-02-22 DIAGNOSIS — H61.21 IMPACTED CERUMEN OF RIGHT EAR: ICD-10-CM

## 2024-02-22 DIAGNOSIS — R42 DIZZINESS: Primary | ICD-10-CM

## 2024-05-14 ENCOUNTER — TELEPHONE (OUTPATIENT)
Dept: AUDIOLOGY | Facility: CLINIC | Age: 74
End: 2024-05-14
Payer: COMMERCIAL

## 2024-05-14 NOTE — TELEPHONE ENCOUNTER
[facility] Call Center    Phone Message    May a detailed message be left on voicemail: yes     Reason for Call: Other: The pt is being referred for Dizziness, impacted cerumen and decreased hearing. She would like to come to the [facility] as that is the closest location to her. Per the protocols, please contact the pt to discuss and schedule. She can't make any appts. Before 1 on weekdays. Thanks.      Action Taken: Message routed to:  [facility] ([facility]): AUDIO    Travel Screening: Not Applicable

## 2024-05-15 NOTE — TELEPHONE ENCOUNTER
Patient confirmed scheduled appointment:  Date: 9/27  Time: 130  Provider: Dejuan HAMLIN  Location: [facility]   Testing/imaging: WIN and new vestibular  Additional notes: .

## 2024-05-15 NOTE — TELEPHONE ENCOUNTER
LVM to reschedule visits    Patient has 2 options      See Melyssa or Yareli with audio prior for hearing loss and ear cleaning only and then see Yareli for a dizzy work up (still in AM)  Schedule at [facility] for one visit for afternoon        Gave direct number

## 2024-09-27 ENCOUNTER — OFFICE VISIT (OUTPATIENT)
Dept: AUDIOLOGY | Facility: CLINIC | Age: 74
End: 2024-09-27
Payer: COMMERCIAL

## 2024-09-27 ENCOUNTER — OFFICE VISIT (OUTPATIENT)
Dept: OTOLARYNGOLOGY | Facility: CLINIC | Age: 74
End: 2024-09-27
Payer: COMMERCIAL

## 2024-09-27 DIAGNOSIS — H90.3 SENSORINEURAL HEARING LOSS (SNHL) OF BOTH EARS: Primary | ICD-10-CM

## 2024-09-27 DIAGNOSIS — E87.8 DISEQUILIBRIUM SYNDROME: Primary | ICD-10-CM

## 2024-09-27 DIAGNOSIS — G62.9 NEUROPATHY: ICD-10-CM

## 2024-09-27 DIAGNOSIS — H83.2X3 VESTIBULAR HYPOFUNCTION OF BOTH EARS: ICD-10-CM

## 2024-09-27 DIAGNOSIS — I65.03 STENOSIS OF BOTH VERTEBRAL ARTERIES: ICD-10-CM

## 2024-09-27 PROCEDURE — 99203 OFFICE O/P NEW LOW 30 MIN: CPT | Performed by: OTOLARYNGOLOGY

## 2024-09-27 PROCEDURE — 92557 COMPREHENSIVE HEARING TEST: CPT | Performed by: AUDIOLOGIST

## 2024-09-27 PROCEDURE — 92550 TYMPANOMETRY & REFLEX THRESH: CPT | Performed by: AUDIOLOGIST

## 2024-09-27 RX ORDER — LEVALBUTEROL INHALATION SOLUTION 0.63 MG/3ML
1 SOLUTION RESPIRATORY (INHALATION) EVERY 4 HOURS PRN
COMMUNITY
Start: 2023-10-29

## 2024-09-27 RX ORDER — VITAMIN B COMPLEX
1 CAPSULE ORAL
COMMUNITY
Start: 2024-04-23

## 2024-09-27 RX ORDER — BUDESONIDE 90 UG/1
2 AEROSOL, POWDER RESPIRATORY (INHALATION)
COMMUNITY
Start: 2023-04-18

## 2024-09-27 RX ORDER — ACETYLCYSTEINE 600 MG
600 CAPSULE ORAL DAILY
COMMUNITY

## 2024-09-27 RX ORDER — IPRATROPIUM BROMIDE AND ALBUTEROL SULFATE 2.5; .5 MG/3ML; MG/3ML
1 SOLUTION RESPIRATORY (INHALATION) EVERY 6 HOURS PRN
COMMUNITY

## 2024-09-27 RX ORDER — EVOLOCUMAB 140 MG/ML
1 INJECTION, SOLUTION SUBCUTANEOUS
COMMUNITY
Start: 2023-04-18

## 2024-09-27 RX ORDER — TIOTROPIUM BROMIDE AND OLODATEROL 3.124; 2.736 UG/1; UG/1
2 SPRAY, METERED RESPIRATORY (INHALATION) EVERY 24 HOURS
COMMUNITY
Start: 2023-04-18

## 2024-09-27 RX ORDER — SODIUM CHLORIDE 1 G/1
1 TABLET ORAL DAILY
COMMUNITY
Start: 2024-04-23

## 2024-09-27 RX ORDER — LOSARTAN POTASSIUM 50 MG/1
50 TABLET ORAL 2 TIMES DAILY
COMMUNITY
Start: 2023-04-18

## 2024-09-27 ASSESSMENT — ENCOUNTER SYMPTOMS
EYES NEGATIVE: 1
RESPIRATORY NEGATIVE: 1
STRIDOR: 0
CONSTITUTIONAL NEGATIVE: 1
SINUS PAIN: 0
DIZZINESS: 1
GASTROINTESTINAL NEGATIVE: 1
TINGLING: 1

## 2024-09-27 NOTE — PROGRESS NOTES
No chief complaint on file.     PCP: Jmi Patricia     Referring Provider: Referred Self    There were no vitals taken for this visit.    ENT Problem List:  Patient Active Problem List   Diagnosis     COPD (chronic obstructive pulmonary disease) (H)     Moderate single current episode of major depressive disorder (H)     Marital/partner relational problem      Current Medications:  Current Outpatient Medications   Medication Sig Dispense Refill     ASPIRIN PO Take 81 mg by mouth daily       carvedilol (COREG) 12.5 MG tablet Take 12.5 mg by mouth       cholecalciferol (VITAMIN D3) 1000 UNIT tablet Take 1,000 Units by mouth       Cholecalciferol (VITAMIN D3) 1000 UNITS CAPS        co-enzyme Q-10 (SM COENZYME Q-10) 100 MG CAPS capsule Take 100 mg by mouth       exemestane (AROMASIN) 25 MG tablet Take 25 mg by mouth       exemestane (AROMASIN) 25 MG tablet Take 25 mg by mouth daily       fish oil-omega-3 fatty acids 1000 MG capsule        Flavoring Agent (GRAPEFRUIT FLAVOR) OIL Take 250 mg by mouth       fluticasone (FLONASE) 50 MCG/ACT spray        fluticasone-salmeterol (ADVAIR DISKUS) 100-50 MCG/DOSE diskus inhaler        fluticasone-salmeterol (ADVAIR DISKUS) 250-50 MCG/DOSE diskus inhaler INHALE 1 PUFF INTO THE LUNGS 2 TIMES DAILY 1 Inhaler 12     fluticasone-salmeterol (ADVAIR) 250-50 MCG/DOSE diskus inhaler Inhale 1 puff into the lungs       ipratropium (ATROVENT HFA) 17 MCG/ACT inhaler Inhale 2 puffs into the lungs every 6 hours 1 Inhaler 12     ipratropium (ATROVENT) 0.02 % neb solution Inhale 0.5 mg into the lungs       Ipratropium Bromide HFA (ATROVENT HFA IN) Inhale 2 puffs into the lungs       levalbuterol (XOPENEX CONC) 1.25 MG/0.5ML NEBU Take 0.5 mLs (1.25 mg) by nebulization every 6 hours as needed for wheezing 60 mL 1     lovastatin (MEVACOR) 40 MG tablet Take 80 mg by mouth       metoprolol (LOPRESSOR) 25 MG tablet Take 25 mg by mouth 2 times daily.       omega 3 1000 MG CAPS        ondansetron  (ZOFRAN-ODT) 4 MG ODT tab Take 4 mg by mouth       order for DME Equipment being ordered: Portable travelling Nebulizer 1 Device 0     ORDER FOR DME Equipment being ordered: Nebulizer - Portable for travel. 1 Device 0     oseltamivir (TAMIFLU) 75 MG capsule Take 1 capsule (75 mg) by mouth 2 times daily 10 capsule 0     red yeast rice 600 MG CAPS Take 600 mg by mouth daily       Red Yeast Rice Extract 600 MG CAPS Take 1,200 mg by mouth       Saw Palmetto 160 MG CAPS        saw palmetto 450 MG CAPS capsule Take 450 mg by mouth 3 times daily       tiotropium (SPIRIVA RESPIMAT) 2.5 MCG/ACT inhalation aerosol Inhale 2 puffs into the lungs daily 12 g 11     Tiotropium Bromide Monohydrate 1.25 MCG/ACT AERS        TRIAMTERENE PO        triamterene-hydrochlorothiazide (MAXZIDE-25) 37.5-25 MG per tablet        VITAMIN D, CHOLECALCIFEROL, PO Take 1,000 Units by mouth daily       XOPENEX HFA 45 MCG/ACT inhaler INHALE 1-2 PUFFS INTO THE LUNGS EVERY 6 HOURS AS NEEDED FOR SHORTNESS OF BREATH / DYSPNEA 3 Inhaler 3     No current facility-administered medications for this visit.     XR CHEST 2 VIEWS  Order: [id number]  Impression    COMPARISON:  10/25/2023    FINDINGS: Normal cardiomediastinal silhouette and pulmonary vasculature. Thin linear scarring in the left upper lobe and mild atelectasis in the right middle lobe. Lungs appear otherwise clear. No pneumothorax or pleural effusion. Degenerative changes in the spine and shoulders.    HPI  Pleasant 74 year old female presents today as a(n) new patient for dizziness. The patient says that she s coming in for a vertigo. She says that it only happens when she s up and walking she says that her body does not feel like her body. She says the dizziness feels like  walking on the ocean . She said that it started in October of last year after she was using a Q-tip in her left ear and that s what brought the dizziness. She also said that she had a fall on it. She went to the hospital  because she felt like she had a cold with chills and she lost her voice. 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 She said that she also has a history of some disequilibrium, nausea, and muscle weakness since January of 2018. She said that her medication infusion for her breast cancer, zometa, to help absorb better because of the beta blocker, and it usually happens after she s eating. The patient says that since she was a little girl, she has always been motion sick. She says that she feels nauseous. She said it usually begins in a series which consists of disc equilibrium, nausea, and muscle weakness, but now it is just dizziness, and it is constant. She says that sometimes she has double vision. She also says that she has dry eyes so she has to take drops. She had cataract surgery. She denies tingling. The patient has a history of COPD. The patient denies any trauma. The patient says that she has seasonal allergies and she is allergic to mold.       Review of Systems   Constitutional: Negative.    HENT:  Positive for hearing loss and tinnitus. Negative for congestion, ear discharge, ear pain, nosebleeds and sinus pain.    Eyes: Negative.    Respiratory: Negative.  Negative for stridor.    Gastrointestinal: Negative.    Skin: Negative.    Neurological:  Positive for dizziness and tingling.   Endo/Heme/Allergies: Negative.        Physical Exam  Vitals and nursing note reviewed.   Constitutional:       Appearance: Normal appearance.   HENT:      Head: Normocephalic and atraumatic.      Jaw: There is normal jaw occlusion.      Right Ear: Tympanic membrane and ear canal normal. Decreased hearing noted. No middle ear effusion.      Left Ear: Tympanic membrane and ear canal normal. Decreased hearing noted.  No middle ear effusion.      Nose: Nose normal. No mucosal edema, congestion or rhinorrhea.      Right  Nostril: No occlusion.      Left Nostril: No occlusion.      Right Turbinates: Not enlarged or swollen.      Left Turbinates: Not enlarged or swollen.      Right Sinus: No maxillary sinus tenderness or frontal sinus tenderness.      Left Sinus: No maxillary sinus tenderness or frontal sinus tenderness.      Mouth/Throat:      Mouth: Mucous membranes are moist.      Pharynx: Oropharynx is clear. Uvula midline.   Eyes:      Extraocular Movements: Extraocular movements intact.      Pupils: Pupils are equal, round, and reactive to light.   Neurological:      Mental Status: She is alert.       Audiometry was done.      A/P  Audiogram/Imaging was independently reviewed and discussed in detail with the patient. This pleasant patient is having off-balance sensation, motion associated disequilibrium likely due to medication effect, neuropathy, or cervical dizziness with vertebral artery stenosis. I will request vestibular therapy to improve her balance, enhance coordination, decrease motion sensitivity, decrease fall risk, and r/o canalolithiasis. She will also have a vestibular testing to r/o any peripheral vestibular issues.  Follow up in clinic as scheduled.    Scribe/Staff:    Scribe Disclosure:   I, Janett Mitchell, am serving as a scribe; to document services personally performed by Vish Main MD based on data collection and the provider's statements to me.     Provider Disclosure:  I agree with above History, Review of Systems, Physical exam and Plan.  I have reviewed the content of the documentation and have edited it as needed. I have personally performed the services documented here and the documentation accurately represents those services and the decisions I have made.      Electronically signed by:  Vish Main MD

## 2024-09-27 NOTE — PROGRESS NOTES
AUDIOLOGY REPORT    SUMMARY: Audiology visit completed. See audiogram for results.    RECOMMENDATIONS: Follow-up with ENT.    Brandi Us  Doctor of Audiology  [place] License # [id number]

## 2024-09-27 NOTE — NURSING NOTE
"Mercedes Hairston's goals for this visit include:   Chief Complaint   Patient presents with    Consult     Dizziness \"unstability, like walking on a ocean line.\" Onset October 2023 after using qtip and plugged Lt ear, started dizziness, and fell. The next day felt very cold, chills, and lost voice. 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 Hx of \"nausea, muscle weakness, and disequalibrium\" since starting infusions for breast cancer in January 2018; continues to occur especially after eating. No treatments tried.     She requests these members of her care team be copied on today's visit information: yes    PCP: Jim Patricia    Referring Provider:  Referred Self, MD  No address on file    There were no vitals taken for this visit.    Do you need any medication refills at today's visit? no    Sánchez Rivera CMA  "

## 2024-09-27 NOTE — LETTER
9/27/2024      Mercedes Hairston  [address]  [place] [address]      Dear Colleague,    Thank you for referring your patient, Mercedes Hairston, to the [facility]. Please see a copy of my visit note below.    No chief complaint on file.     PCP: Jim Patricia     Referring Provider: Referred Self    There were no vitals taken for this visit.    ENT Problem List:  Patient Active Problem List   Diagnosis     COPD (chronic obstructive pulmonary disease) (H)     Moderate single current episode of major depressive disorder (H)     Marital/partner relational problem      Current Medications:  Current Outpatient Medications   Medication Sig Dispense Refill     ASPIRIN PO Take 81 mg by mouth daily       carvedilol (COREG) 12.5 MG tablet Take 12.5 mg by mouth       cholecalciferol (VITAMIN D3) 1000 UNIT tablet Take 1,000 Units by mouth       Cholecalciferol (VITAMIN D3) 1000 UNITS CAPS        co-enzyme Q-10 (SM COENZYME Q-10) 100 MG CAPS capsule Take 100 mg by mouth       exemestane (AROMASIN) 25 MG tablet Take 25 mg by mouth       exemestane (AROMASIN) 25 MG tablet Take 25 mg by mouth daily       fish oil-omega-3 fatty acids 1000 MG capsule        Flavoring Agent (GRAPEFRUIT FLAVOR) OIL Take 250 mg by mouth       fluticasone (FLONASE) 50 MCG/ACT spray        fluticasone-salmeterol (ADVAIR DISKUS) 100-50 MCG/DOSE diskus inhaler        fluticasone-salmeterol (ADVAIR DISKUS) 250-50 MCG/DOSE diskus inhaler INHALE 1 PUFF INTO THE LUNGS 2 TIMES DAILY 1 Inhaler 12     fluticasone-salmeterol (ADVAIR) 250-50 MCG/DOSE diskus inhaler Inhale 1 puff into the lungs       ipratropium (ATROVENT HFA) 17 MCG/ACT inhaler Inhale 2 puffs into the lungs every 6 hours 1 Inhaler 12     ipratropium (ATROVENT) 0.02 % neb solution Inhale 0.5 mg into the lungs       Ipratropium Bromide HFA (ATROVENT HFA IN) Inhale 2 puffs into the lungs       levalbuterol (XOPENEX CONC) 1.25 MG/0.5ML NEBU Take 0.5  mLs (1.25 mg) by nebulization every 6 hours as needed for wheezing 60 mL 1     lovastatin (MEVACOR) 40 MG tablet Take 80 mg by mouth       metoprolol (LOPRESSOR) 25 MG tablet Take 25 mg by mouth 2 times daily.       omega 3 1000 MG CAPS        ondansetron (ZOFRAN-ODT) 4 MG ODT tab Take 4 mg by mouth       order for DME Equipment being ordered: Portable travelling Nebulizer 1 Device 0     ORDER FOR DME Equipment being ordered: Nebulizer - Portable for travel. 1 Device 0     oseltamivir (TAMIFLU) 75 MG capsule Take 1 capsule (75 mg) by mouth 2 times daily 10 capsule 0     red yeast rice 600 MG CAPS Take 600 mg by mouth daily       Red Yeast Rice Extract 600 MG CAPS Take 1,200 mg by mouth       Saw Palmetto 160 MG CAPS        saw palmetto 450 MG CAPS capsule Take 450 mg by mouth 3 times daily       tiotropium (SPIRIVA RESPIMAT) 2.5 MCG/ACT inhalation aerosol Inhale 2 puffs into the lungs daily 12 g 11     Tiotropium Bromide Monohydrate 1.25 MCG/ACT AERS        TRIAMTERENE PO        triamterene-hydrochlorothiazide (MAXZIDE-25) 37.5-25 MG per tablet        VITAMIN D, CHOLECALCIFEROL, PO Take 1,000 Units by mouth daily       XOPENEX HFA 45 MCG/ACT inhaler INHALE 1-2 PUFFS INTO THE LUNGS EVERY 6 HOURS AS NEEDED FOR SHORTNESS OF BREATH / DYSPNEA 3 Inhaler 3     No current facility-administered medications for this visit.     XR CHEST 2 VIEWS  Order: [id number]  Impression    COMPARISON:  10/25/2023    FINDINGS: Normal cardiomediastinal silhouette and pulmonary vasculature. Thin linear scarring in the left upper lobe and mild atelectasis in the right middle lobe. Lungs appear otherwise clear. No pneumothorax or pleural effusion. Degenerative changes in the spine and shoulders.    HPI  Pleasant 74 year old female presents today as a(n) new patient for dizziness. The patient says that she s coming in for a vertigo. She says that it only happens when she s up and walking she says that her body does not feel like her body. She  says the dizziness feels like  walking on the ocean . She said that it started in October of last year after she was using a Q-tip in her left ear and that s what brought the dizziness. She also said that she had a fall on it. She went to the hospital because she felt like she had a cold with chills and she lost her voice. 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 She said that she also has a history of some disequilibrium, nausea, and muscle weakness since January of 2018. She said that her medication infusion for her breast cancer, zometa, to help absorb better because of the beta blocker, and it usually happens after she s eating. The patient says that since she was a little girl, she has always been motion sick. She says that she feels nauseous. She said it usually begins in a series which consists of disc equilibrium, nausea, and muscle weakness, but now it is just dizziness, and it is constant. She says that sometimes she has double vision. She also says that she has dry eyes so she has to take drops. She had cataract surgery. She denies tingling. The patient has a history of COPD. The patient denies any trauma. The patient says that she has seasonal allergies and she is allergic to mold.       Review of Systems   Constitutional: Negative.    HENT:  Positive for hearing loss and tinnitus. Negative for congestion, ear discharge, ear pain, nosebleeds and sinus pain.    Eyes: Negative.    Respiratory: Negative.  Negative for stridor.    Gastrointestinal: Negative.    Skin: Negative.    Neurological:  Positive for dizziness and tingling.   Endo/Heme/Allergies: Negative.        Physical Exam  Vitals and nursing note reviewed.   Constitutional:       Appearance: Normal appearance.   HENT:      Head: Normocephalic and atraumatic.      Jaw: There is normal jaw occlusion.      Right Ear: Tympanic membrane and ear  canal normal. Decreased hearing noted. No middle ear effusion.      Left Ear: Tympanic membrane and ear canal normal. Decreased hearing noted.  No middle ear effusion.      Nose: Nose normal. No mucosal edema, congestion or rhinorrhea.      Right Nostril: No occlusion.      Left Nostril: No occlusion.      Right Turbinates: Not enlarged or swollen.      Left Turbinates: Not enlarged or swollen.      Right Sinus: No maxillary sinus tenderness or frontal sinus tenderness.      Left Sinus: No maxillary sinus tenderness or frontal sinus tenderness.      Mouth/Throat:      Mouth: Mucous membranes are moist.      Pharynx: Oropharynx is clear. Uvula midline.   Eyes:      Extraocular Movements: Extraocular movements intact.      Pupils: Pupils are equal, round, and reactive to light.   Neurological:      Mental Status: She is alert.       Audiometry was done.      A/P  Audiogram/Imaging was independently reviewed and discussed in detail with the patient. This pleasant patient is having off-balance sensation, motion associated disequilibrium likely due to medication effect, neuropathy, or cervical dizziness with vertebral artery stenosis. I will request vestibular therapy to improve her balance, enhance coordination, decrease motion sensitivity, decrease fall risk, and r/o canalolithiasis. She will also have a vestibular testing to r/o any peripheral vestibular issues.  Follow up in clinic as scheduled.    Scribe/Staff:    Scribe Disclosure:   I, Janett Mitchell, am serving as a scribe; to document services personally performed by Vish Main MD based on data collection and the provider's statements to me.     Provider Disclosure:  I agree with above History, Review of Systems, Physical exam and Plan.  I have reviewed the content of the documentation and have edited it as needed. I have personally performed the services documented here and the documentation accurately represents those services and the decisions I have  made.      Electronically signed by:  Vish Main MD           Again, thank you for allowing me to participate in the care of your patient.        Sincerely,        Vish Main MD

## 2024-10-13 ENCOUNTER — HEALTH MAINTENANCE LETTER (OUTPATIENT)
Age: 74
End: 2024-10-13

## 2024-11-04 ENCOUNTER — THERAPY VISIT (OUTPATIENT)
Dept: PHYSICAL THERAPY | Facility: CLINIC | Age: 74
End: 2024-11-04
Attending: OTOLARYNGOLOGY
Payer: COMMERCIAL

## 2024-11-04 DIAGNOSIS — G62.9 NEUROPATHY: ICD-10-CM

## 2024-11-04 DIAGNOSIS — E87.8 DISEQUILIBRIUM SYNDROME: Primary | ICD-10-CM

## 2024-11-04 DIAGNOSIS — H83.2X3 VESTIBULAR HYPOFUNCTION OF BOTH EARS: ICD-10-CM

## 2024-11-04 DIAGNOSIS — I65.03 STENOSIS OF BOTH VERTEBRAL ARTERIES: ICD-10-CM

## 2024-11-04 PROCEDURE — 97162 PT EVAL MOD COMPLEX 30 MIN: CPT | Mod: GP | Performed by: PHYSICAL THERAPIST

## 2024-11-04 NOTE — PROGRESS NOTES
PHYSICAL THERAPY EVALUATION  Type of Visit: Evaluation       Fall Risk Screen:  Fall screen completed by: PT  Have you fallen 2 or more times in the past year?: No  Have you fallen and had an injury in the past year?: No  Is patient a fall risk?: Yes    Subjective         Presenting condition or subjective complaint: (Patient-Rptd) it began as disequilibrium for  5.5  years and then became increasingly serious imbalance.  It was different in past. It is like being on a ship. Much more now. Now I have fear of falling. I have to touch things in the house. I just got a new cat that walks right between my legs. It wasn't constant before. Now it is very impactful. 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 Cannot wear the shoes anymore for dancing, has to go barefoot and then students cannot hear her steps. Footwork: On one foot and raise the heel. I teach 4 classes a week, an hour long each. Private lessons an hour every two weeks.  It jumped from disequilibrium to dizziness. I cannot walk in the dark anymore. Fell at night in 2023, I don't remember hitting the floor, slept on the floor and waited until daylight to get up. Studio has concrete very steep flight of stairs. Internal feeling of body not naturally righting itself, internal off kilter. Serious motion sickness as a child. Migraines as a young female. Quit getting them in her 30's.  I dont get this feeling when sitting, laying or driving.  Only when I move, It would come and go before. alejandro IV that day it started. 3 weeks and then abated and came back, length of time varies. Now every day for 20 minutes. Associated with the 3's,:After I eat generally or when I get up in the AM. My neck gets really weak, then muscle weakness down to shoulders and to middle back and then to glutes, down back of thighs. Gut nausea also (in bowels) is unpleasant. Then the disequilibrium comes on. I can't do a turn anymore in flamingo (a real quick pivot  "with feet tandem). Autonomic neuropathy diagnosis. Possible caused by radiation (breast cancer).   Date of onset: 10/27/24 (order date)    Relevant medical history: (Patient-Rptd) Bladder or bowel problems; Cancer; COPD; Dizziness; High blood pressure; Menopause   History of breast cancer  Dates & types of surgery: (Patient-Rptd) 2016 breast cancer, \"dizziness\" is new , H blood pressure well controlled    Prior diagnostic imaging/testing results: (Patient-Rptd) Other (Patient-Rptd) not specifically for this, I don't think, but we should check a late Sept ER visit to [facility].   Prior therapy history for the same diagnosis, illness or injury: (Patient-Rptd) No  1 appt at [facility] and recommended ENT    Prior Level of Function  Transfers: Independent  Ambulation: Independent  ADL: Independent  IADL: Driving, Finances, Housekeeping, Laundry, Meal preparation, Medication management, Work    Living Environment  Social support: (Patient-Rptd) Alone   Type of home: (Patient-Rptd) Apartment/condo   Stairs to enter the home: (Patient-Rptd) No       Ramp: (Patient-Rptd) No   Stairs inside the home: (Patient-Rptd) No       Help at home: (Patient-Rptd) None  Equipment owned:       Employment: (Patient-Rptd) Yes (Patient-Rptd) self employed, , choreographer, 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 My leisure is sneaking away from bookwork to read recipes online!    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  WFLs for " retraction/protraction and tilt  STRENGTH:   BED MOBILITY:   TRANSFERS: Independent    GAIT:   Level of [place]: Independent  Assistive Device(s): None  Gait Deviations: WNL  Gait Distance:   Stairs: 4 steps one railing tandem pattern (knee is a factor)    SPECIAL TESTS  Functional Gait Assessment (FGA) TOTAL SCORE: (MAXIMUM SCORE 30): 19    10 Meter Walk Test (Comfortable)  6.38 seconds and 11 steps. 6.16 seconds and 11 steps.   10 Meter Walk Test (Fast)  5.15 seconds and 10 steps.    6 Minute Walk Test (6MWT)           Hess Balance Scale (BBS)     5 Times Sit-to-Stand (5TSTS)       Dynamic Gait Index (DGI)     Timed Up and Go (TUG) - sec 8.81 seconds and 9.68 seconds   Single Leg Stance Right (sec)    Single Leg Stance Left (sec)    Modified CTSIB Conditions (sec) Cond 1:   Cond 2:   Cond 4:   Cond 5 :    Romberg  (sec)    Sharpened Romberg (sec)    30 Second Sit to Stand (reps/height)    Mini-BESTest            SENSATION: right arm and both feet autonomic neuropathy. Not diabetic  REFLEXES:   COORDINATION:   MUSCLE TONE:       Assessment & Plan   CLINICAL IMPRESSIONS  Medical Diagnosis: disequilibrium disorder    Treatment Diagnosis: impaired balance/high level gait   Impression/Assessment: Patient is a 74 year old female with balance complaints.  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 Not at risk based on TUG. No time today to do vestibular testing with goggles or DVA and those need to be completed. I also think she needs further vestibular function testing to rule out a bilateral loss. The following significant findings have been identified: Impaired balance, Impaired sensation, Impaired gait, Dizziness, and Disequilibrium . These impairments interfere with their ability to perform self care tasks, work tasks, recreational activities, household chores, household mobility, and community mobility as compared to previous level of function.     Clinical Decision  Making (Complexity):  Clinical Presentation: Evolving/Changing  Clinical Presentation Rationale: based on medical and personal factors listed in PT evaluation  Clinical Decision Making (Complexity): Moderate complexity (chronic problem with multiple symptoms)    PLAN OF CARE  Treatment Interventions:  Interventions: Gait Training, Neuromuscular Re-education, Therapeutic Exercise, Self-Care/Home Management    Long Term Goals     PT Goal 1  Goal Identifier: DHI  Goal Description: Improve on DHI (self rating of symptoms) from 28/100 to 14/100  Rationale: to maximize safety and independence with performance of ADLs and functional tasks;to maximize safety and independence within the home;to maximize safety and independence within the community;to maximize safety and independence with self cares  Target Date: 01/26/25  PT Goal 2  Goal Identifier: gait with head motion horizontal  Goal Description: she devin be bernard to amb with horizontal head motion (left) with no change in gait speed/qulaity  Rationale: to maximize safety and independence within the community;to maximize safety and independence within the home;to maximize safety and independence with performance of ADLs and functional tasks;to maximize safety and independence with self cares  Target Date: 01/26/25      Frequency of Treatment: 1x a week  Duration of Treatment: 12 weeks    Recommended Referrals to Other Professionals:  audiology to do vestibular function testing  Education Assessment:   Learner/Method: Patient;Listening  Education Comments: explained that we have more to test next appt and I do think the medication (IV Zomig) could be a factor.    Risks and benefits of evaluation/treatment have been explained.   Patient/Family/caregiver agrees with Plan of Care.     Evaluation Time:     PT Kait, Moderate Complexity Minutes (21165): 45  Signing Clinician: Shantel Burrows, PT, [facility]                                                                                    OUTPATIENT PHYSICAL THERAPY      PLAN OF TREATMENT FOR OUTPATIENT REHABILITATION   Patient's Last Name, First Name, Mercedes Esteves YOB: 1950   Provider's Name   [facility]   Medical Record No.  [id number]     Onset Date: 10/27/24 (order date)  Start of Care Date: 11/04/24     Medical Diagnosis:  disequilibrium disorder      PT Treatment Diagnosis:  impaired balance/high level gait Plan of Treatment  Frequency/Duration: 1x a week/ 12 weeks    Certification date from 11/04/24 to 01/26/25         See note for plan of treatment details and functional goals     Shantel Burrows, PT                         I CERTIFY THE NEED FOR THESE SERVICES FURNISHED UNDER        THIS PLAN OF TREATMENT AND WHILE UNDER MY CARE     (Physician attestation of this document indicates review and certification of the therapy plan).              Referring Provider:  Vish Main    Initial Assessment  See Epic Evaluation- Start of Care Date: 11/04/24

## 2024-11-05 NOTE — PROGRESS NOTES
11/04/24 1400   Signing Clinician's Name / Credentials   Signing clinician's name / credentials Joanie WHITTINGTONT NCS   Functional Gait Assessment (GONZALO Bravo., VANIA Perez, et al. (2004))   1. GAIT LEVEL SURFACE 2  (6.38 seconds and 11 steps, 6.16 secodns and 11 steps)   2. CHANGE IN GAIT SPEED 3  (5.15 seconds and 10 steps)   3. GAIT WITH HORIZONTAL HEAD TURNS 2  (head left is the problem)   4. GAIT WITH VERTICAL HEAD TURNS 3   5. GAIT AND PIVOT TURN 3   6. STEP OVER OBSTACLE 3  (has a knee right issue)   7. GAIT WITH NARROW BASE OF SUPPORT 0   8. GAIT WITH EYES CLOSED 1  (very wide LEIF and arms semi guarded, > 13 seconds)   9. AMBULATING BACKWARDS 1  (18.03 seconds and 29 steps)   10. STEPS 1  (ascend with right knee leading, descends sideways left leg leading, forward goes down with right first. knee is part of problem)   Total Functional Gait Assessment Score   TOTAL SCORE: (MAXIMUM SCORE 30) 19     Functional Gait Assessment (FGA): The FGA assesses postural stability during various walking tasks.   Gait assistive device used: None    Scores of <22 /30 have been correlated with predicting falls in community-dwelling older adults according to Shelly & Floyd 2010.   Scores of <18 /30 have been correlated with increased risk for falls in patients with Parkinsons Disease according to PozoShadi, Rajput et al 2014.  Minimal Detectable Change for patients with acute/chronic stroke = 4.2 according to Thieme & Ritschel 2009  Minimal Detectable Change for patients with vestibular disorder = 8 according to Shelly & Barrientos 2010    Assessment (rationale for performing, application to patient s function & care plan): at risk for falsl with high level gait activities  (Minutes billed as physical performance test):  part of evaluation    Joanie Burrows DPT, MPT, NCS  Physical Therapist   Board Certified Neurologic Clinical Specialist     [facility], [facility]   [address]    [place] [address]   [email or web address]   [phone]   Clinic: [phone] //   Fax: [phone]

## 2024-11-06 ENCOUNTER — TELEPHONE (OUTPATIENT)
Dept: AUDIOLOGY | Facility: CLINIC | Age: 74
End: 2024-11-06
Payer: COMMERCIAL

## 2024-11-06 NOTE — TELEPHONE ENCOUNTER
Patient confirmed scheduled appointment:  Date: 12/26  Time: 1  Provider: Neida  Location: [facility]   Testing/imaging:   Additional notes:

## 2024-11-06 NOTE — TELEPHONE ENCOUNTER
LVM to schedule VNG/CHAIR per Joanie merrill. If testing indicates CDP test is needed, [place] [facility] [place] do [place] for that      Gave direct number

## 2024-11-11 ENCOUNTER — THERAPY VISIT (OUTPATIENT)
Dept: PHYSICAL THERAPY | Facility: CLINIC | Age: 74
End: 2024-11-11
Attending: OTOLARYNGOLOGY
Payer: COMMERCIAL

## 2024-11-11 ENCOUNTER — TELEPHONE (OUTPATIENT)
Dept: OTOLARYNGOLOGY | Facility: CLINIC | Age: 74
End: 2024-11-11

## 2024-11-11 DIAGNOSIS — E87.8 DISEQUILIBRIUM SYNDROME: Primary | ICD-10-CM

## 2024-11-11 DIAGNOSIS — G62.9 NEUROPATHY: ICD-10-CM

## 2024-11-11 PROCEDURE — 97112 NEUROMUSCULAR REEDUCATION: CPT | Mod: GP | Performed by: PHYSICAL THERAPIST

## 2024-11-11 NOTE — TELEPHONE ENCOUNTER
[facility] Call Center    Phone Message    May a detailed message be left on voicemail: yes     Reason for Call: Other: Pt calling to reschedule balance testing. Pt states she does not want her appt before a holiday, as she will be drinking alcohol. Please call. Thanks.      Action Taken: Other: AUDIO    Travel Screening: Not Applicable     Date of Service:

## 2024-11-18 ENCOUNTER — THERAPY VISIT (OUTPATIENT)
Dept: PHYSICAL THERAPY | Facility: CLINIC | Age: 74
End: 2024-11-18
Attending: OTOLARYNGOLOGY
Payer: COMMERCIAL

## 2024-11-18 DIAGNOSIS — E87.8 DISEQUILIBRIUM SYNDROME: Primary | ICD-10-CM

## 2024-11-18 PROCEDURE — 97112 NEUROMUSCULAR REEDUCATION: CPT | Mod: GP | Performed by: PHYSICAL THERAPIST

## 2024-11-18 PROCEDURE — 97110 THERAPEUTIC EXERCISES: CPT | Mod: GP | Performed by: PHYSICAL THERAPIST

## 2024-12-03 ENCOUNTER — TELEPHONE (OUTPATIENT)
Dept: AUDIOLOGY | Facility: CLINIC | Age: 74
End: 2024-12-03
Payer: COMMERCIAL

## 2024-12-03 NOTE — TELEPHONE ENCOUNTER
[facility] Call Center    Phone Message    May a detailed message be left on voicemail: yes     Reason for Call: Other: Pt calling to reschedule her Balance Testing on 1/30, please call to discuss, thanks     Action Taken: Other: ENT    Travel Screening: Not Applicable     Date of Service:

## 2024-12-04 NOTE — TELEPHONE ENCOUNTER
Patient confirmed scheduled appointment:  Date: 2/20  Time: 2  Provider:   Location: [facility]   Testing/imaging: VNG/CHAIR  Additional notes:

## 2024-12-05 ENCOUNTER — THERAPY VISIT (OUTPATIENT)
Dept: PHYSICAL THERAPY | Facility: CLINIC | Age: 74
End: 2024-12-05
Attending: OTOLARYNGOLOGY
Payer: COMMERCIAL

## 2024-12-05 DIAGNOSIS — E87.8 DISEQUILIBRIUM SYNDROME: Primary | ICD-10-CM

## 2024-12-05 PROCEDURE — 97112 NEUROMUSCULAR REEDUCATION: CPT | Mod: GP | Performed by: PHYSICAL THERAPIST

## 2024-12-16 ENCOUNTER — THERAPY VISIT (OUTPATIENT)
Dept: PHYSICAL THERAPY | Facility: CLINIC | Age: 74
End: 2024-12-16
Attending: OTOLARYNGOLOGY
Payer: COMMERCIAL

## 2024-12-16 DIAGNOSIS — E87.8 DISEQUILIBRIUM SYNDROME: Primary | ICD-10-CM

## 2024-12-16 PROCEDURE — 97110 THERAPEUTIC EXERCISES: CPT | Mod: GP | Performed by: PHYSICAL THERAPIST

## 2025-01-08 ENCOUNTER — THERAPY VISIT (OUTPATIENT)
Dept: PHYSICAL THERAPY | Facility: CLINIC | Age: 75
End: 2025-01-08
Attending: OTOLARYNGOLOGY
Payer: COMMERCIAL

## 2025-01-08 DIAGNOSIS — E87.8 DISEQUILIBRIUM SYNDROME: Primary | ICD-10-CM

## 2025-01-08 DIAGNOSIS — G62.9 NEUROPATHY: ICD-10-CM

## 2025-01-08 PROCEDURE — 97110 THERAPEUTIC EXERCISES: CPT | Mod: GP | Performed by: PHYSICAL THERAPIST

## 2025-01-08 PROCEDURE — 97750 PHYSICAL PERFORMANCE TEST: CPT | Mod: GP | Performed by: PHYSICAL THERAPIST

## 2025-01-09 NOTE — PROGRESS NOTES
"   01/08/25 1300   Signing Clinician's Name / Credentials   Signing clinician's name / credentials Joanie Burrows DPT NCS   Functional Gait Assessment (GONZALO Bravo, VANIA Perez, et al. (2004))   1. GAIT LEVEL SURFACE 3  (5.56 seconds and 10 steps, 5.37 seconds and 9 steps, arms swing nicely)   2. CHANGE IN GAIT SPEED 3  (4.22 seconds adn 9 steps, 4 seconds and 8 stpes)   3. GAIT WITH HORIZONTAL HEAD TURNS 3  (can do this when eyes looking straight ahead or down, harder forher when eyes looking up)   4. GAIT WITH VERTICAL HEAD TURNS 3   5. GAIT AND PIVOT TURN 3   6. STEP OVER OBSTACLE 3  (right knee can be an issue some days doing this)   7. GAIT WITH NARROW BASE OF SUPPORT 0   8. GAIT WITH EYES CLOSED 1  (13.21 seconds, doesnt like this, more of a \"penguin\" walk with arms a bit guarded)   9. AMBULATING BACKWARDS 2  (12.22 seconds and 20 steps, easy for her today)   10. STEPS 2  (normal gait today, knee not bothering her)   Total Functional Gait Assessment Score   TOTAL SCORE: (MAXIMUM SCORE 30) 23     Functional Gait Assessment (FGA): The FGA assesses postural stability during various walking tasks.   Gait assistive device used: None    Scores of <22 /30 have been correlated with predicting falls in community-dwelling older adults according to Shelly & Floyd 2010.   Scores of <18 /30 have been correlated with increased risk for falls in patients with Parkinsons Disease according to PozoShadi, Rajput et al 2014.  Minimal Detectable Change for patients with acute/chronic stroke = 4.2 according to Thieme & Ritschel 2009  Minimal Detectable Change for patients with vestibular disorder = 8 according to Shelly & Barrientos 2010    Assessment (rationale for performing, application to patient s function & care plan): improved on gait testing for 19/30 to 23/30, I do not think it will further improve. She is active and not actually falling.  (Minutes billed as physical performance test):  15    Joanie Burrows DPT, MPT, " NCS  Physical Therapist   Board Certified Neurologic Clinical Specialist     [facility], [facility]   [address] [place] [address]   [email or web address]   [phone]   Clinic: [phone] //   Fax: [phone]

## 2025-02-19 ENCOUNTER — TELEPHONE (OUTPATIENT)
Dept: AUDIOLOGY | Facility: CLINIC | Age: 75
End: 2025-02-19
Payer: COMMERCIAL

## 2025-02-19 NOTE — TELEPHONE ENCOUNTER
[facility] Call Center    Phone Message    May a detailed message be left on voicemail: yes     Reason for Call: Other: Per pt pt she needs to r/s her appt for tomorrow. Please call to discuss. Thank you     Action Taken: Message routed to:  [facility]): AUDIO    Travel Screening: Not Applicable     Date of Service:

## 2025-03-22 ENCOUNTER — HEALTH MAINTENANCE LETTER (OUTPATIENT)
Age: 75
End: 2025-03-22

## 2025-04-14 ENCOUNTER — THERAPY VISIT (OUTPATIENT)
Dept: PHYSICAL THERAPY | Facility: CLINIC | Age: 75
End: 2025-04-14
Attending: OTOLARYNGOLOGY
Payer: COMMERCIAL

## 2025-04-14 DIAGNOSIS — G62.9 NEUROPATHY: ICD-10-CM

## 2025-04-14 DIAGNOSIS — E87.8 DISEQUILIBRIUM SYNDROME: Primary | ICD-10-CM

## 2025-04-14 PROCEDURE — 97750 PHYSICAL PERFORMANCE TEST: CPT | Mod: GP | Performed by: PHYSICAL THERAPIST

## 2025-04-14 PROCEDURE — 97112 NEUROMUSCULAR REEDUCATION: CPT | Mod: GP | Performed by: PHYSICAL THERAPIST

## 2025-04-14 PROCEDURE — 97110 THERAPEUTIC EXERCISES: CPT | Mod: GP | Performed by: PHYSICAL THERAPIST

## 2025-04-14 NOTE — PROGRESS NOTES
04/14/25 0500   PT Goal 1   Goal Identifier DHI   Goal Description Improve on DHI (self rating of symptoms) from 28/100 to 14/100   Rationale to maximize safety and independence with performance of ADLs and functional tasks;to maximize safety and independence within the home;to maximize safety and independence within the community;to maximize safety and independence with self cares   Goal Progress no progress on thsi, scored 34/100. Partly becuase derrick is limied by other medical issues not just dizziness. I do tno anticipate this will improve.   Target Date 01/26/25   Date Met 04/14/25   PT Goal 2   Goal Identifier gait with head motion horizontal   Goal Description she devin be bernard to amb with horizontal head motion (left) with no change in gait speed/qulaity   Rationale to maximize safety and independence within the community;to maximize safety and independence within the home;to maximize safety and independence with performance of ADLs and functional tasks;to maximize safety and independence with self cares   Goal Progress met. This has improved   Target Date 01/26/25   Date Met 04/14/25   PT Goal 3   Goal Identifier HEP   Goal Description SHe will be IND with HEP of strengthening and balance exs ([employer], Luzmaria Chi and custom PTRX exercises)   Rationale to maximize safety and independence with performance of ADLs and functional tasks;to maximize safety and independence within the home;to maximize safety and independence within the community;to maximize safety and independence with self cares   Target Date 06/08/25     She has been seen for 7 visits this episode fo care. Last appt was on 1/8/2025 until seen again today 4/12/2025. She had other medical issues. Her balance has improved but I do not think it will improve much further. She is still at risk for falls with high level mobility. The goal would be to maintain with a HEP. She will see me for one more appt in 4-6 weeks to follow up on the HEP and retest a few  things. She has chronic medical issues including autonomic dysfunction and neuropathy.     [facility]                                                                                   OUTPATIENT PHYSICAL THERAPY    PLAN OF TREATMENT FOR OUTPATIENT REHABILITATION   Patient's Last Name, First Name, Mercedes Esteves YOB: 1950   Provider's Name   [facility]   Medical Record No.  [id number]     Onset Date: 10/27/24 (order date)  Start of Care Date: 11/04/24     Medical Diagnosis:  disequilibrium disorder      PT Treatment Diagnosis:  impaired balance/high level gait Plan of Treatment  Frequency/Duration: 1x every 4 weeks/ 12 weeks    Certification date from 01/27/25 to 04/21/25         See note for plan of treatment details and functional goals     Shantel Burrows, PT                         I CERTIFY THE NEED FOR THESE SERVICES FURNISHED UNDER        THIS PLAN OF TREATMENT AND WHILE UNDER MY CARE     (Physician attestation of this document indicates review and certification of the therapy plan).              Referring Provider:  Vish Main    Initial Assessment  See Epic Evaluation- Start of Care Date: 11/04/24

## 2025-04-14 NOTE — PROGRESS NOTES
"   04/14/25 1400   Signing Clinician's Name / Credentials   Signing clinician's name / credentials Joanie merrill DPT NCS   Functional Gait Assessment (GONZALO Bravo, VANIA Perez, et al. (2004))   1. GAIT LEVEL SURFACE 3  (5.72 seconds and 10 steps, 5.25 seconds and 9 steps)   2. CHANGE IN GAIT SPEED 3  (4.6 seconds and 9 steps)   3. GAIT WITH HORIZONTAL HEAD TURNS 3  (harder for her with eyes down, she feels she goes to the right with eyes down)   4. GAIT WITH VERTICAL HEAD TURNS 3   5. GAIT AND PIVOT TURN 3   6. STEP OVER OBSTACLE 3   7. GAIT WITH NARROW BASE OF SUPPORT 0   8. GAIT WITH EYES CLOSED 1  (15 seconds, \"penguin walk\" feet are aprat and she is guarded)   9. AMBULATING BACKWARDS 1  (16 seconds and 24 steps)   10. STEPS 2   Total Functional Gait Assessment Score   TOTAL SCORE: (MAXIMUM SCORE 30) 22     Functional Gait Assessment (FGA): The FGA assesses postural stability during various walking tasks.   Gait assistive device used: None    Scores of <22 /30 have been correlated with predicting falls in community-dwelling older adults according to Shelly & Floyd 2010.   Scores of <18 /30 have been correlated with increased risk for falls in patients with Parkinsons Disease according to Shadi Pozo, Rajput et al 2014.  Minimal Detectable Change for patients with acute/chronic stroke = 4.2 according to Thibilly & Ritschel 2009  Minimal Detectable Change for patients with vestibular disorder = 8 according to Shelly Barrientos 2010at    Assessment (rationale for performing, application to patient s function & care plan): She is at risk for falls with high level mobility. Particularly tandem gait and gait with eyes closed are really hard for her. At [facility] she scored 19/30 on 11/4/24 and on 1/8/2025 she scored 23/30.     Timed Up and Go (TUG): TUG is a test of basic mobility skills. It is used to screen individuals prone to falls.  Gait assistive device used: none     Patient score 9.6 seconds and 9.85 seconds  >=13.5 " seconds indicate at risk for falls in older adults according to Jackie et al 2000.  >=30 seconds - indicates dependency in most ADL and mobility skills according to Posaloko & Wayne 1991  >11.5 seconds indicate at risk for falls in adults with Parkinson's Disease    Minimal Detectable Change for patients with Alzheimer s = 4.09 sec   Minimal Detectable Change for patients with Parkinson s Disease = 3.5 sec   according to Roberto & Christina Valenzuela 2011    Normative Data from Dorothy IA, Henrry D, Latonya M, Duarte E, Michelle. 2017  Age                 Average Time (in seconds)  20-39                     5.9-7.4         40-59                     6.3-7.8   60-69                     7.1-9.0  70-79                     8.2-10.2  80-99                    10.0-12.7            Assessment (rationale for performing, application to patient s function & care plan): not at risk for falls with this test, she did well on the turns.    (Minutes billed as physical performance test):  20    Joanie Markos DPT, MPT, NCS  Physical Therapist   Board Certified Neurologic Clinical Specialist     [facility], [facility]   [address] [place] [address]   [email or web address]   [phone]   Clinic: [phone] //   Fax: [phone]

## (undated) DEVICE — EYE SOL BSS 500ML

## (undated) DEVICE — EYE PACK CUSTOM ANTERIOR 30DEG TIP CENTURION PPK6682-04

## (undated) DEVICE — EYE PACK BVI READYPAK KIT #1

## (undated) DEVICE — EYE SHIELD PLASTIC

## (undated) DEVICE — PACK CATARACT CUSTOM SO DALE SEY32CTFCX

## (undated) DEVICE — EYE CANN IRR 30GA  ANTERIOR CHAMBER 581273

## (undated) DEVICE — SEALANT OCULAR RESURE RS-1004-US-10

## (undated) DEVICE — GLOVE PROTEXIS MICRO 7.0  2D73PM70

## (undated) DEVICE — LINEN TOWEL PACK X5 5464

## (undated) DEVICE — GLOVE PROTEXIS MICRO 6.5  2D73PM65

## (undated) DEVICE — TAPE MICROPORE 1"X1.5YD 1530S-1

## (undated) DEVICE — Device

## (undated) DEVICE — EYE KNIFE SLIT XSTAR VISITEC 2.4MM 45DEG BEVEL UP 373724

## (undated) DEVICE — TAPE MICROPORE 2"X1.5YD 1530S-2

## (undated) DEVICE — SYR 01ML 25GA 5/8"

## (undated) DEVICE — SYR 10ML FINGER CONTROL W/O NDL 309695

## (undated) DEVICE — EYE PACK CUSTOM ANTERIOR 45DEG TIP CENTURION PPK6925-01

## (undated) DEVICE — SYR 03ML LL W/O NDL

## (undated) DEVICE — GOWN LG DISP 9515

## (undated) DEVICE — NDL 19GA 1.5" FILTER 305200

## (undated) DEVICE — GLOVE PROTEXIS MICRO 6.0  2D73PM60

## (undated) RX ORDER — CEFAZOLIN SODIUM 500 MG/2.2ML
INJECTION, POWDER, FOR SOLUTION INTRAMUSCULAR; INTRAVENOUS
Status: DISPENSED
Start: 2017-12-18

## (undated) RX ORDER — WATER 10 ML/10ML
INJECTION INTRAMUSCULAR; INTRAVENOUS; SUBCUTANEOUS
Status: DISPENSED
Start: 2017-12-18

## (undated) RX ORDER — LIDOCAINE HYDROCHLORIDE 10 MG/ML
INJECTION, SOLUTION EPIDURAL; INFILTRATION; INTRACAUDAL; PERINEURAL
Status: DISPENSED
Start: 2017-12-18

## (undated) RX ORDER — EPINEPHRINE 1 MG/ML
INJECTION, SOLUTION, CONCENTRATE INTRAVENOUS
Status: DISPENSED
Start: 2018-01-15

## (undated) RX ORDER — DEXAMETHASONE SODIUM PHOSPHATE 4 MG/ML
INJECTION, SOLUTION INTRA-ARTICULAR; INTRALESIONAL; INTRAMUSCULAR; INTRAVENOUS; SOFT TISSUE
Status: DISPENSED
Start: 2018-01-15

## (undated) RX ORDER — DEXAMETHASONE SODIUM PHOSPHATE 4 MG/ML
INJECTION, SOLUTION INTRA-ARTICULAR; INTRALESIONAL; INTRAMUSCULAR; INTRAVENOUS; SOFT TISSUE
Status: DISPENSED
Start: 2017-12-18

## (undated) RX ORDER — CEFAZOLIN SODIUM 500 MG/2.2ML
INJECTION, POWDER, FOR SOLUTION INTRAMUSCULAR; INTRAVENOUS
Status: DISPENSED
Start: 2018-01-15

## (undated) RX ORDER — WATER 10 ML/10ML
INJECTION INTRAMUSCULAR; INTRAVENOUS; SUBCUTANEOUS
Status: DISPENSED
Start: 2018-01-15

## (undated) RX ORDER — ONDANSETRON 2 MG/ML
INJECTION INTRAMUSCULAR; INTRAVENOUS
Status: DISPENSED
Start: 2017-12-18

## (undated) RX ORDER — FENTANYL CITRATE 50 UG/ML
INJECTION, SOLUTION INTRAMUSCULAR; INTRAVENOUS
Status: DISPENSED
Start: 2017-12-18

## (undated) RX ORDER — ACETAMINOPHEN 500 MG
TABLET ORAL
Status: DISPENSED
Start: 2017-12-18

## (undated) RX ORDER — LIDOCAINE HYDROCHLORIDE 10 MG/ML
INJECTION, SOLUTION EPIDURAL; INFILTRATION; INTRACAUDAL; PERINEURAL
Status: DISPENSED
Start: 2018-01-15